# Patient Record
Sex: FEMALE | Race: WHITE | NOT HISPANIC OR LATINO | Employment: OTHER | URBAN - METROPOLITAN AREA
[De-identification: names, ages, dates, MRNs, and addresses within clinical notes are randomized per-mention and may not be internally consistent; named-entity substitution may affect disease eponyms.]

---

## 2017-01-12 ENCOUNTER — APPOINTMENT (OUTPATIENT)
Dept: INTERNAL MEDICINE | Facility: MEDICAL CENTER | Age: 77
End: 2017-01-12
Payer: MEDICARE

## 2017-03-23 ENCOUNTER — APPOINTMENT (OUTPATIENT)
Dept: RADIOLOGY | Facility: MEDICAL CENTER | Age: 77
DRG: 056 | End: 2017-03-23
Attending: EMERGENCY MEDICINE
Payer: MEDICARE

## 2017-03-23 ENCOUNTER — HOSPITAL ENCOUNTER (INPATIENT)
Facility: MEDICAL CENTER | Age: 77
LOS: 10 days | DRG: 056 | End: 2017-04-03
Attending: EMERGENCY MEDICINE | Admitting: INTERNAL MEDICINE
Payer: MEDICARE

## 2017-03-23 DIAGNOSIS — F02.81 ALZHEIMER'S DEMENTIA WITH BEHAVIORAL DISTURBANCE, UNSPECIFIED TIMING OF DEMENTIA ONSET: ICD-10-CM

## 2017-03-23 DIAGNOSIS — N39.0 ACUTE UTI: ICD-10-CM

## 2017-03-23 DIAGNOSIS — G30.9 ALZHEIMER'S DEMENTIA WITH BEHAVIORAL DISTURBANCE, UNSPECIFIED TIMING OF DEMENTIA ONSET: ICD-10-CM

## 2017-03-23 DIAGNOSIS — F01.518 VASCULAR DEMENTIA WITH BEHAVIOR DISTURBANCE (HCC): ICD-10-CM

## 2017-03-23 LAB
ALBUMIN SERPL BCP-MCNC: 4.7 G/DL (ref 3.2–4.9)
ALBUMIN/GLOB SERPL: 1.5 G/DL
ALP SERPL-CCNC: 91 U/L (ref 30–99)
ALT SERPL-CCNC: 16 U/L (ref 2–50)
ANION GAP SERPL CALC-SCNC: 13 MMOL/L (ref 0–11.9)
APPEARANCE UR: CLEAR
APPEARANCE UR: CLEAR
AST SERPL-CCNC: 27 U/L (ref 12–45)
BASOPHILS # BLD AUTO: 0.3 % (ref 0–1.8)
BASOPHILS # BLD: 0.05 K/UL (ref 0–0.12)
BILIRUB SERPL-MCNC: 0.5 MG/DL (ref 0.1–1.5)
BUN SERPL-MCNC: 15 MG/DL (ref 8–22)
CALCIUM SERPL-MCNC: 10.1 MG/DL (ref 8.5–10.5)
CHLORIDE SERPL-SCNC: 109 MMOL/L (ref 96–112)
CO2 SERPL-SCNC: 17 MMOL/L (ref 20–33)
COLOR UR AUTO: YELLOW
COLOR UR AUTO: YELLOW
COMMENT 1642: NORMAL
CREAT SERPL-MCNC: 1.01 MG/DL (ref 0.5–1.4)
EOSINOPHIL # BLD AUTO: 0.03 K/UL (ref 0–0.51)
EOSINOPHIL NFR BLD: 0.2 % (ref 0–6.9)
ERYTHROCYTE [DISTWIDTH] IN BLOOD BY AUTOMATED COUNT: 39.9 FL (ref 35.9–50)
GFR SERPL CREATININE-BSD FRML MDRD: 53 ML/MIN/1.73 M 2
GLOBULIN SER CALC-MCNC: 3.1 G/DL (ref 1.9–3.5)
GLUCOSE SERPL-MCNC: 111 MG/DL (ref 65–99)
GLUCOSE UR QL STRIP.AUTO: NEGATIVE MG/DL
GLUCOSE UR STRIP.AUTO-MCNC: NEGATIVE MG/DL
HCT VFR BLD AUTO: 44.4 % (ref 37–47)
HGB BLD-MCNC: 14.6 G/DL (ref 12–16)
IMM GRANULOCYTES # BLD AUTO: 0.11 K/UL (ref 0–0.11)
IMM GRANULOCYTES NFR BLD AUTO: 0.7 % (ref 0–0.9)
KETONES UR QL STRIP.AUTO: NEGATIVE MG/DL
KETONES UR STRIP.AUTO-MCNC: NEGATIVE MG/DL
LACTATE BLD-SCNC: 1.6 MMOL/L (ref 0.5–2)
LEUKOCYTE ESTERASE UR QL STRIP.AUTO: ABNORMAL
LEUKOCYTE ESTERASE UR QL STRIP.AUTO: NORMAL
LYMPHOCYTES # BLD AUTO: 1.22 K/UL (ref 1–4.8)
LYMPHOCYTES NFR BLD: 7.6 % (ref 22–41)
MCH RBC QN AUTO: 28.6 PG (ref 27–33)
MCHC RBC AUTO-ENTMCNC: 32.9 G/DL (ref 33.6–35)
MCV RBC AUTO: 86.9 FL (ref 81.4–97.8)
MONOCYTES # BLD AUTO: 0.7 K/UL (ref 0–0.85)
MONOCYTES NFR BLD AUTO: 4.3 % (ref 0–13.4)
MORPHOLOGY BLD-IMP: NORMAL
NEUTROPHILS # BLD AUTO: 14.02 K/UL (ref 2–7.15)
NEUTROPHILS NFR BLD: 86.9 % (ref 44–72)
NITRITE UR QL STRIP.AUTO: NEGATIVE
NITRITE UR QL STRIP.AUTO: NEGATIVE
NRBC # BLD AUTO: 0 K/UL
NRBC BLD AUTO-RTO: 0 /100 WBC
PH UR STRIP.AUTO: 5.5 [PH]
PH UR STRIP.AUTO: 5.5 [PH] (ref 5–8)
PLATELET # BLD AUTO: 146 K/UL (ref 164–446)
PMV BLD AUTO: 11 FL (ref 9–12.9)
POTASSIUM SERPL-SCNC: 3.8 MMOL/L (ref 3.6–5.5)
PROT SERPL-MCNC: 7.8 G/DL (ref 6–8.2)
PROT UR QL STRIP: ABNORMAL MG/DL
PROT UR QL STRIP: NORMAL MG/DL
RBC # BLD AUTO: 5.11 M/UL (ref 4.2–5.4)
RBC UR QL AUTO: ABNORMAL
RBC UR QL AUTO: NORMAL
SODIUM SERPL-SCNC: 139 MMOL/L (ref 135–145)
SP GR UR STRIP.AUTO: >=1.03
SP GR UR: >=1.03
TROPONIN I SERPL-MCNC: <0.01 NG/ML (ref 0–0.04)
WBC # BLD AUTO: 16.1 K/UL (ref 4.8–10.8)

## 2017-03-23 PROCEDURE — 80053 COMPREHEN METABOLIC PANEL: CPT

## 2017-03-23 PROCEDURE — 87040 BLOOD CULTURE FOR BACTERIA: CPT

## 2017-03-23 PROCEDURE — 85025 COMPLETE CBC W/AUTO DIFF WBC: CPT

## 2017-03-23 PROCEDURE — 96375 TX/PRO/DX INJ NEW DRUG ADDON: CPT

## 2017-03-23 PROCEDURE — 81002 URINALYSIS NONAUTO W/O SCOPE: CPT | Performed by: EMERGENCY MEDICINE

## 2017-03-23 PROCEDURE — 700105 HCHG RX REV CODE 258: Performed by: EMERGENCY MEDICINE

## 2017-03-23 PROCEDURE — 84484 ASSAY OF TROPONIN QUANT: CPT

## 2017-03-23 PROCEDURE — 96372 THER/PROPH/DIAG INJ SC/IM: CPT

## 2017-03-23 PROCEDURE — 83605 ASSAY OF LACTIC ACID: CPT

## 2017-03-23 PROCEDURE — 87086 URINE CULTURE/COLONY COUNT: CPT

## 2017-03-23 PROCEDURE — 81002 URINALYSIS NONAUTO W/O SCOPE: CPT

## 2017-03-23 PROCEDURE — 84100 ASSAY OF PHOSPHORUS: CPT

## 2017-03-23 PROCEDURE — 93005 ELECTROCARDIOGRAM TRACING: CPT | Performed by: EMERGENCY MEDICINE

## 2017-03-23 PROCEDURE — 94760 N-INVAS EAR/PLS OXIMETRY 1: CPT

## 2017-03-23 PROCEDURE — 96365 THER/PROPH/DIAG IV INF INIT: CPT

## 2017-03-23 PROCEDURE — 71010 DX-CHEST-PORTABLE (1 VIEW): CPT

## 2017-03-23 PROCEDURE — 99285 EMERGENCY DEPT VISIT HI MDM: CPT

## 2017-03-23 PROCEDURE — 83735 ASSAY OF MAGNESIUM: CPT

## 2017-03-23 PROCEDURE — 700111 HCHG RX REV CODE 636 W/ 250 OVERRIDE (IP): Performed by: EMERGENCY MEDICINE

## 2017-03-23 RX ORDER — CEFTRIAXONE 2 G/1
2 INJECTION, POWDER, FOR SOLUTION INTRAMUSCULAR; INTRAVENOUS ONCE
Status: COMPLETED | OUTPATIENT
Start: 2017-03-23 | End: 2017-03-23

## 2017-03-23 RX ORDER — ZIPRASIDONE MESYLATE 20 MG/ML
10 INJECTION, POWDER, LYOPHILIZED, FOR SOLUTION INTRAMUSCULAR ONCE
Status: COMPLETED | OUTPATIENT
Start: 2017-03-23 | End: 2017-03-23

## 2017-03-23 RX ORDER — MIDAZOLAM HYDROCHLORIDE 1 MG/ML
INJECTION INTRAMUSCULAR; INTRAVENOUS
Status: COMPLETED
Start: 2017-03-23 | End: 2017-03-23

## 2017-03-23 RX ORDER — SODIUM CHLORIDE 9 MG/ML
1000 INJECTION, SOLUTION INTRAVENOUS ONCE
Status: COMPLETED | OUTPATIENT
Start: 2017-03-23 | End: 2017-03-23

## 2017-03-23 RX ORDER — LORAZEPAM 2 MG/ML
1 INJECTION INTRAMUSCULAR ONCE
Status: COMPLETED | OUTPATIENT
Start: 2017-03-23 | End: 2017-03-23

## 2017-03-23 RX ADMIN — CEFTRIAXONE SODIUM 2 G: 2 INJECTION, POWDER, FOR SOLUTION INTRAMUSCULAR; INTRAVENOUS at 21:57

## 2017-03-23 RX ADMIN — SODIUM CHLORIDE 1000 ML: 9 INJECTION, SOLUTION INTRAVENOUS at 19:53

## 2017-03-23 RX ADMIN — ZIPRASIDONE MESYLATE 10 MG: 20 INJECTION, POWDER, LYOPHILIZED, FOR SOLUTION INTRAMUSCULAR at 20:24

## 2017-03-23 RX ADMIN — LORAZEPAM 1 MG: 2 INJECTION INTRAMUSCULAR; INTRAVENOUS at 19:50

## 2017-03-23 ASSESSMENT — PAIN SCALES - GENERAL: PAINLEVEL_OUTOF10: 0

## 2017-03-23 NOTE — IP AVS SNAPSHOT
" <p align=\"LEFT\"><IMG SRC=\"//[email or web address]$/[email or web address]\" alt=\"Image\" WIDTH=\"50%\" HEIGHT=\"200\" BORDER=\"\"></p>                   Name:Audrey Gauthier  Medical Record Number:[id number]  CSN: [id number]    YOB: 1940   Age: 77 y.o.  Sex: female  HT:1.626 m (5' 4.02\") WT: 52 kg (114 lb 10.2 oz)          Admit Date: 3/23/2017     Discharge Date:   Today's Date: 4/3/2017  Attending Doctor:  STEPHANIE Rodriguez*                  Allergies:  Review of patient's allergies indicates no known allergies.          Follow-up Information     1. Follow up with Tamia Saenz M.D.. Schedule an appointment as soon as possible for a visit in 3 weeks.    Specialty:  Internal Medicine    Why:  post DC follow up    Contact information    [address]  [address] [address] [phone]          2. Follow up with [facility] ([facility]) .    Specialty:  Skilled Nursing Facility    Contact information    [address] [place] [address] [phone]         Medication List      Take these Medications        Instructions    amlodipine 5 MG Tabs   Commonly known as:  NORVASC    Take 1 Tab by mouth every day.   Dose:  5 mg       aspirin 81 MG tablet    Take 81 mg by mouth every day.   Dose:  81 mg       coenzyme Q-10 30 MG capsule    Take 60 mg by mouth every day.   Dose:  60 mg       guaifenesin -10 MG/5ML Syrp syrup   Commonly known as:  ROBITUSSIN DM    Take 10 mL by mouth every 6 hours as needed for Cough.   Dose:  10 mL       quetiapine 25 MG Tabs   Commonly known as:  SEROQUEL    Take 2 Tabs by mouth every evening.   Dose:  50 mg       thiamine 100 MG tablet   Commonly known as:  THIAMINE    Take 1 Tab by mouth every day.   Dose:  100 mg       vitamin B-12 1000 MCG Tabs    Take 1,000 mcg by mouth every day.   Dose:  1000 mcg       vitamin D 1000 UNIT Tabs   Commonly known as:  cholecalciferol    Take 1,000 Units by mouth every day.   Dose:  1000 Units         "

## 2017-03-23 NOTE — IP AVS SNAPSHOT
4/3/2017          Audrey Gauthier  [address] [place] [address]    Dear Audrey:    [facility] wants to ensure your discharge home is safe and you or your loved ones have had all your questions answered regarding your care after you leave the hospital.    You may receive a telephone call within two days of your discharge.  This call is to make certain you understand your discharge instructions as well as ensure we provided you with the best care possible during your stay with us.     The call will only last approximately 3-5 minutes and will be done by a nurse.    Once again, we want to ensure your discharge home is safe and that you have a clear understanding of any next steps in your care.  If you have any questions or concerns, please do not hesitate to contact us, we are here for you.  Thank you for choosing [facility] for your healthcare needs.    Sincerely,    Jeremy Hogan    [facility]

## 2017-03-23 NOTE — IP AVS SNAPSHOT
" Home Care Instructions                                                                                                                  Name:Audrey Gauthier  Medical Record Number:[id number]  CSN: [id number]    YOB: 1940   Age: 77 y.o.  Sex: female  HT:1.626 m (5' 4.02\") WT: 52 kg (114 lb 10.2 oz)          Admit Date: 3/23/2017     Discharge Date:   Today's Date: 4/3/2017  Attending Doctor:  STEPHANIE Rodriguez*                  Allergies:  Review of patient's allergies indicates no known allergies.            Discharge Instructions       Discharge Instructions    Discharged to other by ambulance with self. Discharged via ambulance, hospital escort: Refused.  Special equipment needed: Not Applicable    Be sure to schedule a follow-up appointment with your primary care doctor or any specialists as instructed.     Discharge Plan:   Influenza Vaccine Indication: Not indicated: Previously immunized this influenza season and > 8 years of age    I understand that a diet low in cholesterol, fat, and sodium is recommended for good health. Unless I have been given specific instructions below for another diet, I accept this instruction as my diet prescription.   Other diet: full liquid nectar thick    Special Instructions: None    · Is patient discharged on Warfarin / Coumadin?   No     · Is patient Post Blood Transfusion?  No    Depression / Suicide Risk    As you are discharged from this [facility] facility, it is important to learn how to keep safe from harming yourself.    Recognize the warning signs:  · Abrupt changes in personality, positive or negative- including increase in energy   · Giving away possessions  · Change in eating patterns- significant weight changes-  positive or negative  · Change in sleeping patterns- unable to sleep or sleeping all the time   · Unwillingness or inability to communicate  · Depression  · Unusual sadness, discouragement and loneliness  · Talk of wanting to die  · Neglect of " personal appearance   · Rebelliousness- reckless behavior  · Withdrawal from people/activities they love  · Confusion- inability to concentrate     If you or a loved one observes any of these behaviors or has concerns about self-harm, here's what you can do:  · Talk about it- your feelings and reasons for harming yourself  · Remove any means that you might use to hurt yourself (examples: pills, rope, extension cords, firearm)  · Get professional help from the community (Mental Health, Substance Abuse, psychological counseling)  · Do not be alone:Call your Safe Contact- someone whom you trust who will be there for you.  · Call your local CRISIS HOTLINE 800-7793 or 829-511-5451  · Call your local Children's Mobile Crisis Response Team [place] (383) 503-3253 or www.[employer] ([employer])  · Call the toll free National Suicide Prevention Hotlines   · National Suicide Prevention Lifeline 273-223-XHWQ (7888)  · Wikia Line Network 800-SUICIDE (444-6174)        Follow-up Information     1. Follow up with Tamia Saenz M.D.. Schedule an appointment as soon as possible for a visit in 3 weeks.    Specialty:  Internal Medicine    Why:  post DC follow up    Contact information    [address]  [address]  [facility] [address] [phone]          2. Follow up with [facility] ([facility]) .    Specialty:  Skilled Nursing Facility    Contact information    [address] [place] [address] [phone]         Discharge Medication Instructions:    Below are the medications your physician expects you to take upon discharge:    Review all your home medications and newly ordered medications with your doctor and/or pharmacist. Follow medication instructions as directed by your doctor and/or pharmacist.    Please keep your medication list with you and share with your physician.               Medication List      START taking these medications        Instructions    amlodipine 5 MG Tabs   Last time this  was given:  5 mg on 4/3/2017  8:31 AM   Commonly known as:  NORVASC    Take 1 Tab by mouth every day.   Dose:  5 mg       guaifenesin -10 MG/5ML Syrp syrup   Commonly known as:  ROBITUSSIN DM    Take 10 mL by mouth every 6 hours as needed for Cough.   Dose:  10 mL       thiamine 100 MG tablet   Last time this was given:  100 mg on 4/2/2017  9:04 AM   Commonly known as:  THIAMINE    Take 1 Tab by mouth every day.   Dose:  100 mg         CONTINUE taking these medications        Instructions    aspirin 81 MG tablet    Take 81 mg by mouth every day.   Dose:  81 mg       coenzyme Q-10 30 MG capsule    Take 60 mg by mouth every day.   Dose:  60 mg       quetiapine 25 MG Tabs   Commonly known as:  SEROQUEL    Take 2 Tabs by mouth every evening.   Dose:  50 mg       vitamin B-12 1000 MCG Tabs    Take 1,000 mcg by mouth every day.   Dose:  1000 mcg       vitamin D 1000 UNIT Tabs   Commonly known as:  cholecalciferol    Take 1,000 Units by mouth every day.   Dose:  1000 Units         STOP taking these medications     ARICEPT 10 MG tablet   Generic drug:  donepezil       memantine 5 MG Tabs   Commonly known as:  NAMENDA       simvastatin 40 MG Tabs   Commonly known as:  ZOCOR       tolterodine ER 2 MG Cp24   Commonly known as:  DETROL-LA       tramadol 50 MG Tabs   Commonly known as:  ULTRAM               Instructions           Diet / Nutrition:    Follow any diet instructions given to you by your doctor or the dietician, including how much salt (sodium) you are allowed each day.    If you are overweight, talk to your doctor about a weight reduction plan.    Activity:    Remain physically active following your doctor's instructions about exercise and activity.    Rest often.     Any time you become even a little tired or short of breath, SIT DOWN and rest.    Worsening Symptoms:    Report any of the following signs and symptoms to the doctor's office immediately:    *Pain of jaw, arm, or neck  *Chest pain not relieved by  medication                               *Dizziness or loss of consciousness  *Difficulty breathing even when at rest   *More tired than usual                                       *Bleeding drainage or swelling of surgical site  *Swelling of feet, ankles, legs or stomach                 *Fever (>100ºF)  *Pink or blood tinged sputum  *Weight gain (3lbs/day or 5lbs /week)           *Shock from internal defibrillator (if applicable)  *Palpitations or irregular heartbeats                *Cool and/or numb extremities    Stroke Awareness    Common Risk Factors for Stroke include:    Age  Atrial Fibrillation  Carotid Artery Stenosis  Diabetes Mellitus  Excessive alcohol consumption  High blood pressure  Overweight   Physical inactivity  Smoking    Warning signs and symptoms of a stroke include:    *Sudden numbness or weakness of the face, arm or leg (especially on one side of the body).  *Sudden confusion, trouble speaking or understanding.  *Sudden trouble seeing in one or both eyes.  *Sudden trouble walking, dizziness, loss of balance or coordination.Sudden severe headache with no known cause.    It is very important to get treatment quickly when a stroke occurs. If you experience any of the above warning signs, call 911 immediately.                   Disclaimer         Quit Smoking / Tobacco Use:    I understand the use of any tobacco products increases my chance of suffering from future heart disease or stroke and could cause other illnesses which may shorten my life. Quitting the use of tobacco products is the single most important thing I can do to improve my health. For further information on smoking / tobacco cessation call a Toll Free Quit Line at 1-384.674.8336 (*National Cancer [place]) or 1-445.465.9548 (American Lung Association) or you can access the web based program at www.lungusa.org.    [place] Tobacco Users Help Line:  (296) 873-5106       Toll Free: 1-839.788.2614  Quit Tobacco Program [facility]  Management Services (260)598-2950    Crisis Hotline:    [place] Crisis Hotline:  0-665-GZKROSD or 1-539.368.9712    [place] Crisis Hotline:    1-940.973.5407 or 115-597-3656    Discharge Survey:   Thank you for choosing [facility]. We hope we did everything we could to make your hospital stay a pleasant one. You may be receiving a phone survey and we would appreciate your time and participation in answering the questions. Your input is very valuable to us in our efforts to improve our service to our patients and their families.        My signature on this form indicates that:    1. I have reviewed and understand the above information.  2. My questions regarding this information have been answered to my satisfaction.  3. I have formulated a plan with my discharge nurse to obtain my prescribed medications for home.                  Disclaimer         __________________________________                     __________       ________                       Patient Signature                                                 Date                    Time

## 2017-03-23 NOTE — IP AVS SNAPSHOT
Bevalley Access Code: Activation code not generated  Current Bevalley Status: Active    [employer]  A secure, online tool to manage your health information     [employer]’s Bevalley® is a secure, online tool that connects you to your personalized health information from the privacy of your home -- day or night - making it very easy for you to manage your healthcare. Once the activation process is completed, you can even access your medical information using the Bevalley lovely, which is available for free in the Apple Lovely store or Google Play store.     Bevalley provides the following levels of access (as shown below):   My Chart Features   [facility] Primary Care Doctor [facility]  Specialists [facility]  Urgent  Care Non-[facility]  Primary Care  Doctor   Email your healthcare team securely and privately 24/7 X X X X   Manage appointments: schedule your next appointment; view details of past/upcoming appointments X      Request prescription refills. X      View recent personal medical records, including lab and immunizations X X X X   View health record, including health history, allergies, medications X X X X   Read reports about your outpatient visits, procedures, consult and ER notes X X X X   See your discharge summary, which is a recap of your hospital and/or ER visit that includes your diagnosis, lab results, and care plan. X X       How to register for Bevalley:  1. Go to  [email or web address].  2. Click on the Sign Up Now box, which takes you to the New Member Sign Up page. You will need to provide the following information:  a. Enter your Bevalley Access Code exactly as it appears at the top of this page. (You will not need to use this code after you’ve completed the sign-up process. If you do not sign up before the expiration date, you must request a new code.)   b. Enter your date of birth.   c. Enter your home email address.   d. Click Submit, and follow the next screen’s instructions.  3. Create a Bevalley ID. This will  be your Benesight login ID and cannot be changed, so think of one that is secure and easy to remember.  4. Create a Benesight password. You can change your password at any time.  5. Enter your Password Reset Question and Answer. This can be used at a later time if you forget your password.   6. Enter your e-mail address. This allows you to receive e-mail notifications when new information is available in Benesight.  7. Click Sign Up. You can now view your health information.    For assistance activating your Benesight account, call [phone]

## 2017-03-24 ENCOUNTER — RESOLUTE PROFESSIONAL BILLING HOSPITAL PROF FEE (OUTPATIENT)
Dept: HOSPITALIST | Facility: MEDICAL CENTER | Age: 77
End: 2017-03-24
Payer: MEDICARE

## 2017-03-24 ENCOUNTER — APPOINTMENT (OUTPATIENT)
Dept: RADIOLOGY | Facility: MEDICAL CENTER | Age: 77
DRG: 056 | End: 2017-03-24
Attending: EMERGENCY MEDICINE
Payer: MEDICARE

## 2017-03-24 ENCOUNTER — APPOINTMENT (OUTPATIENT)
Dept: RADIOLOGY | Facility: MEDICAL CENTER | Age: 77
DRG: 056 | End: 2017-03-24
Attending: INTERNAL MEDICINE
Payer: MEDICARE

## 2017-03-24 PROBLEM — D72.829 LEUCOCYTOSIS: Status: ACTIVE | Noted: 2017-03-24

## 2017-03-24 PROBLEM — R41.82 ALTERED MENTAL STATUS: Status: ACTIVE | Noted: 2017-03-24

## 2017-03-24 PROBLEM — N39.0 URINARY TRACT INFECTION: Status: ACTIVE | Noted: 2017-03-24

## 2017-03-24 LAB
25(OH)D3 SERPL-MCNC: 26 NG/ML (ref 30–100)
B-OH-BUTYR SERPL-MCNC: 0.19 MMOL/L (ref 0.02–0.27)
BURR CELLS/RBC NFR CSF MANUAL: 0 %
CLARITY CSF: CLEAR
COLOR CSF: COLORLESS
COLOR SPUN CSF: COLORLESS
FOLATE SERPL-MCNC: 8.6 NG/ML
GLUCOSE CSF-MCNC: 75 MG/DL (ref 40–80)
GRAM STN SPEC: NORMAL
LV EJECT FRACT  99904: 65
LV EJECT FRACT MOD 2C 99903: 68.83
LV EJECT FRACT MOD 4C 99902: 56.08
LV EJECT FRACT MOD BP 99901: 61.54
LYMPHOCYTES NFR CSF: 56 %
MAGNESIUM SERPL-MCNC: 2.5 MG/DL (ref 1.5–2.5)
MONONUC CELLS NFR CSF: 40 %
NEUTROPHILS NFR CSF: 4 %
PHOSPHATE SERPL-MCNC: 2.6 MG/DL (ref 2.5–4.5)
PROT CSF-MCNC: 41 MG/DL (ref 15–45)
RBC # CSF: 6 CELLS/UL
SIGNIFICANT IND 70042: NORMAL
SITE SITE: NORMAL
SOURCE SOURCE: NORMAL
SPECIMEN VOL CSF: 4 ML
TSH SERPL DL<=0.005 MIU/L-ACNC: 1.69 UIU/ML (ref 0.3–3.7)
TUBE # CSF: 2
TUBE # CSF: 4
VIT B12 SERPL-MCNC: 314 PG/ML (ref 211–911)
WBC # CSF: 1 CELLS/UL (ref 0–10)

## 2017-03-24 PROCEDURE — 84443 ASSAY THYROID STIM HORMONE: CPT

## 2017-03-24 PROCEDURE — 36415 COLL VENOUS BLD VENIPUNCTURE: CPT

## 2017-03-24 PROCEDURE — 770020 HCHG ROOM/CARE - TELE (206)

## 2017-03-24 PROCEDURE — 99223 1ST HOSP IP/OBS HIGH 75: CPT | Mod: GC | Performed by: INTERNAL MEDICINE

## 2017-03-24 PROCEDURE — 700111 HCHG RX REV CODE 636 W/ 250 OVERRIDE (IP): Performed by: INTERNAL MEDICINE

## 2017-03-24 PROCEDURE — G8996 SWALLOW CURRENT STATUS: HCPCS | Mod: CL

## 2017-03-24 PROCEDURE — 82010 KETONE BODYS QUAN: CPT

## 2017-03-24 PROCEDURE — 82607 VITAMIN B-12: CPT

## 2017-03-24 PROCEDURE — 89051 BODY FLUID CELL COUNT: CPT

## 2017-03-24 PROCEDURE — 82746 ASSAY OF FOLIC ACID SERUM: CPT

## 2017-03-24 PROCEDURE — 82945 GLUCOSE OTHER FLUID: CPT

## 2017-03-24 PROCEDURE — 87205 SMEAR GRAM STAIN: CPT

## 2017-03-24 PROCEDURE — 92610 EVALUATE SWALLOWING FUNCTION: CPT

## 2017-03-24 PROCEDURE — 700105 HCHG RX REV CODE 258: Performed by: INTERNAL MEDICINE

## 2017-03-24 PROCEDURE — 87529 HSV DNA AMP PROBE: CPT

## 2017-03-24 PROCEDURE — G8997 SWALLOW GOAL STATUS: HCPCS | Mod: CI

## 2017-03-24 PROCEDURE — 70450 CT HEAD/BRAIN W/O DYE: CPT

## 2017-03-24 PROCEDURE — A9270 NON-COVERED ITEM OR SERVICE: HCPCS | Performed by: INTERNAL MEDICINE

## 2017-03-24 PROCEDURE — 009U3ZX DRAINAGE OF SPINAL CANAL, PERCUTANEOUS APPROACH, DIAGNOSTIC: ICD-10-PCS | Performed by: INTERNAL MEDICINE

## 2017-03-24 PROCEDURE — 700111 HCHG RX REV CODE 636 W/ 250 OVERRIDE (IP)

## 2017-03-24 PROCEDURE — 74176 CT ABD & PELVIS W/O CONTRAST: CPT

## 2017-03-24 PROCEDURE — 87070 CULTURE OTHR SPECIMN AEROBIC: CPT

## 2017-03-24 PROCEDURE — 700102 HCHG RX REV CODE 250 W/ 637 OVERRIDE(OP): Performed by: INTERNAL MEDICINE

## 2017-03-24 PROCEDURE — 82306 VITAMIN D 25 HYDROXY: CPT

## 2017-03-24 PROCEDURE — 700105 HCHG RX REV CODE 258: Performed by: STUDENT IN AN ORGANIZED HEALTH CARE EDUCATION/TRAINING PROGRAM

## 2017-03-24 PROCEDURE — 93306 TTE W/DOPPLER COMPLETE: CPT

## 2017-03-24 PROCEDURE — 84157 ASSAY OF PROTEIN OTHER: CPT

## 2017-03-24 PROCEDURE — 93306 TTE W/DOPPLER COMPLETE: CPT | Mod: 26 | Performed by: INTERNAL MEDICINE

## 2017-03-24 PROCEDURE — 700105 HCHG RX REV CODE 258

## 2017-03-24 RX ORDER — AMOXICILLIN 250 MG
2 CAPSULE ORAL 2 TIMES DAILY
Status: DISCONTINUED | OUTPATIENT
Start: 2017-03-24 | End: 2017-04-03 | Stop reason: HOSPADM

## 2017-03-24 RX ORDER — MEMANTINE HYDROCHLORIDE 10 MG/1
5 TABLET ORAL 2 TIMES DAILY
Status: DISCONTINUED | OUTPATIENT
Start: 2017-03-24 | End: 2017-03-24

## 2017-03-24 RX ORDER — QUETIAPINE FUMARATE 25 MG/1
50 TABLET, FILM COATED ORAL EVERY EVENING
Status: DISCONTINUED | OUTPATIENT
Start: 2017-03-24 | End: 2017-03-27

## 2017-03-24 RX ORDER — HALOPERIDOL 5 MG/ML
1 INJECTION INTRAMUSCULAR EVERY 6 HOURS PRN
Status: DISCONTINUED | OUTPATIENT
Start: 2017-03-24 | End: 2017-04-03 | Stop reason: HOSPADM

## 2017-03-24 RX ORDER — QUETIAPINE FUMARATE 25 MG/1
50 TABLET, FILM COATED ORAL EVERY EVENING
Status: DISCONTINUED | OUTPATIENT
Start: 2017-03-24 | End: 2017-03-24

## 2017-03-24 RX ORDER — DEXAMETHASONE SODIUM PHOSPHATE 4 MG/ML
8 INJECTION, SOLUTION INTRA-ARTICULAR; INTRALESIONAL; INTRAMUSCULAR; INTRAVENOUS; SOFT TISSUE EVERY 6 HOURS
Status: DISCONTINUED | OUTPATIENT
Start: 2017-03-24 | End: 2017-03-24

## 2017-03-24 RX ORDER — DONEPEZIL HYDROCHLORIDE 5 MG/1
10 TABLET, FILM COATED ORAL NIGHTLY
Status: DISCONTINUED | OUTPATIENT
Start: 2017-03-24 | End: 2017-03-24

## 2017-03-24 RX ORDER — HEPARIN SODIUM 5000 [USP'U]/ML
5000 INJECTION, SOLUTION INTRAVENOUS; SUBCUTANEOUS EVERY 8 HOURS
Status: DISCONTINUED | OUTPATIENT
Start: 2017-03-24 | End: 2017-04-03 | Stop reason: HOSPADM

## 2017-03-24 RX ORDER — SIMVASTATIN 20 MG
40 TABLET ORAL EVERY EVENING
Status: DISCONTINUED | OUTPATIENT
Start: 2017-03-24 | End: 2017-03-24

## 2017-03-24 RX ORDER — POLYETHYLENE GLYCOL 3350 17 G/17G
1 POWDER, FOR SOLUTION ORAL
Status: DISCONTINUED | OUTPATIENT
Start: 2017-03-24 | End: 2017-04-03 | Stop reason: HOSPADM

## 2017-03-24 RX ORDER — BISACODYL 10 MG
10 SUPPOSITORY, RECTAL RECTAL
Status: DISCONTINUED | OUTPATIENT
Start: 2017-03-24 | End: 2017-04-03 | Stop reason: HOSPADM

## 2017-03-24 RX ORDER — ATORVASTATIN CALCIUM 80 MG/1
80 TABLET, FILM COATED ORAL
Status: DISCONTINUED | OUTPATIENT
Start: 2017-03-24 | End: 2017-03-27

## 2017-03-24 RX ORDER — ONDANSETRON 2 MG/ML
4 INJECTION INTRAMUSCULAR; INTRAVENOUS EVERY 4 HOURS PRN
Status: DISCONTINUED | OUTPATIENT
Start: 2017-03-24 | End: 2017-04-03 | Stop reason: HOSPADM

## 2017-03-24 RX ORDER — ONDANSETRON 4 MG/1
4 TABLET, ORALLY DISINTEGRATING ORAL EVERY 4 HOURS PRN
Status: DISCONTINUED | OUTPATIENT
Start: 2017-03-24 | End: 2017-03-27

## 2017-03-24 RX ORDER — GUAIFENESIN/DEXTROMETHORPHAN 100-10MG/5
10 SYRUP ORAL EVERY 6 HOURS PRN
Status: DISCONTINUED | OUTPATIENT
Start: 2017-03-24 | End: 2017-04-03 | Stop reason: HOSPADM

## 2017-03-24 RX ORDER — ACETAMINOPHEN 325 MG/1
650 TABLET ORAL EVERY 6 HOURS PRN
Status: DISCONTINUED | OUTPATIENT
Start: 2017-03-24 | End: 2017-03-27

## 2017-03-24 RX ORDER — CHOLECALCIFEROL (VITAMIN D3) 125 MCG
1000 CAPSULE ORAL DAILY
Status: DISCONTINUED | OUTPATIENT
Start: 2017-03-24 | End: 2017-03-27

## 2017-03-24 RX ORDER — ASPIRIN 300 MG/1
300 SUPPOSITORY RECTAL EVERY 6 HOURS PRN
Status: DISCONTINUED | OUTPATIENT
Start: 2017-03-24 | End: 2017-03-24

## 2017-03-24 RX ORDER — HEPARIN SODIUM 5000 [USP'U]/ML
5000 INJECTION, SOLUTION INTRAVENOUS; SUBCUTANEOUS EVERY 8 HOURS
Status: DISCONTINUED | OUTPATIENT
Start: 2017-03-24 | End: 2017-03-24

## 2017-03-24 RX ORDER — ASPIRIN 300 MG/1
300 SUPPOSITORY RECTAL ONCE
Status: COMPLETED | OUTPATIENT
Start: 2017-03-24 | End: 2017-03-24

## 2017-03-24 RX ORDER — LORAZEPAM 2 MG/ML
1 INJECTION INTRAMUSCULAR ONCE
Status: COMPLETED | OUTPATIENT
Start: 2017-03-24 | End: 2017-03-25

## 2017-03-24 RX ORDER — SODIUM CHLORIDE 9 MG/ML
INJECTION, SOLUTION INTRAVENOUS CONTINUOUS
Status: DISCONTINUED | OUTPATIENT
Start: 2017-03-24 | End: 2017-03-27

## 2017-03-24 RX ADMIN — HEPARIN SODIUM 5000 UNITS: 5000 INJECTION INTRAVENOUS; SUBCUTANEOUS at 13:48

## 2017-03-24 RX ADMIN — ASPIRIN 300 MG: 300 SUPPOSITORY RECTAL at 12:07

## 2017-03-24 RX ADMIN — ACYCLOVIR SODIUM 547 MG: 500 INJECTION, SOLUTION INTRAVENOUS at 06:38

## 2017-03-24 RX ADMIN — AMPICILLIN SODIUM 2000 MG: 500 INJECTION, POWDER, FOR SOLUTION INTRAMUSCULAR; INTRAVENOUS at 12:52

## 2017-03-24 RX ADMIN — VANCOMYCIN HYDROCHLORIDE 1500 MG: 100 INJECTION, POWDER, LYOPHILIZED, FOR SOLUTION INTRAVENOUS at 07:26

## 2017-03-24 RX ADMIN — HEPARIN SODIUM 5000 UNITS: 5000 INJECTION INTRAVENOUS; SUBCUTANEOUS at 06:37

## 2017-03-24 RX ADMIN — ACYCLOVIR SODIUM 547 MG: 500 INJECTION, SOLUTION INTRAVENOUS at 13:48

## 2017-03-24 RX ADMIN — DEXAMETHASONE SODIUM PHOSPHATE 8 MG: 4 INJECTION, SOLUTION INTRAMUSCULAR; INTRAVENOUS at 06:47

## 2017-03-24 RX ADMIN — AMPICILLIN SODIUM AND SULBACTAM SODIUM 3 G: 2; 1 INJECTION, POWDER, FOR SOLUTION INTRAMUSCULAR; INTRAVENOUS at 23:54

## 2017-03-24 RX ADMIN — ACYCLOVIR SODIUM 547 MG: 500 INJECTION, SOLUTION INTRAVENOUS at 20:42

## 2017-03-24 RX ADMIN — HEPARIN SODIUM 5000 UNITS: 5000 INJECTION INTRAVENOUS; SUBCUTANEOUS at 20:42

## 2017-03-24 RX ADMIN — AMPICILLIN SODIUM AND SULBACTAM SODIUM 3 G: 2; 1 INJECTION, POWDER, FOR SOLUTION INTRAMUSCULAR; INTRAVENOUS at 19:50

## 2017-03-24 RX ADMIN — SODIUM CHLORIDE: 9 INJECTION, SOLUTION INTRAVENOUS at 06:44

## 2017-03-24 RX ADMIN — HALOPERIDOL LACTATE 1 MG: 5 INJECTION, SOLUTION INTRAMUSCULAR at 18:33

## 2017-03-24 ASSESSMENT — COPD QUESTIONNAIRES
HAVE YOU SMOKED AT LEAST 100 CIGARETTES IN YOUR ENTIRE LIFE: YES
HAVE YOU SMOKED AT LEAST 100 CIGARETTES IN YOUR ENTIRE LIFE: YES

## 2017-03-24 ASSESSMENT — PAIN SCALES - GENERAL
PAINLEVEL_OUTOF10: 0

## 2017-03-24 ASSESSMENT — LIFESTYLE VARIABLES
ALCOHOL_USE: NO
EVER_SMOKED: NEVER
EVER_SMOKED: YES

## 2017-03-24 NOTE — PROGRESS NOTES
Pt arrived to the N Unit via ER. R/L soft wrist restraints. BMx1. Confused X4. IVF infusing. Call light at bedside, room close to nurses station.

## 2017-03-24 NOTE — ASSESSMENT & PLAN NOTE
- has h/o dementia (alziemer's and/or vascular). H/o agitation in the past.   - Seems to be at her baseline now (see above)  - Holding home donepezil and memantine, unsure of the benefit given her severe dementia.

## 2017-03-24 NOTE — PROGRESS NOTES
"Pharmacy Kinetics 77 y.o. female on vancomycin day # 1 3/24/2017    Currently on Vancomycin 1500 mg iv x 1 loading dose to be given ~ 05:00    Indication for Treatment: r/o bacterial meningitis    Pertinent history per medical record: Admitted on 3/23/2017 for worsening mental status.  PMH: dementia with behavioral disturbance, unable to care for herself, requiring 24 hour supervision.  However SO noted yesterday patient was drowsy, incoherent and did not eat.  Empiric antibiotics initiated for r/o viral or bacterial meningitis.  Patient is negative for fever, nuchal rigidity, photophobia and seizure but positive for change in mental status.  SO is refusing LP at this time.      Other antibiotics: Ampicillin 2 g IV q4h, Acyclovir 547 mg IV q8h, Rocephin 2 g IV q12h    Allergies: Review of patient's allergies indicates no known allergies.     List concerns for renal function:  Age, elevate SCr (1.01), nephrotoxic medications (Acyclovir)    Pertinent cultures to date:   03/24/17 HSV by PCR not yet donse  03/24/17 LP not yet done  03/23/17 BC x 2 NGTD  03/23/17 urine culture NGTD      Recent Labs      03/23/17 1955   WBC  16.1*   NEUTSPOLYS  86.90*     Recent Labs      03/23/17 1955   BUN  15   CREATININE  1.01   ALBUMIN  4.7     No results for input(s): VANCOTROUGH, VANCOPEAK, VANCORANDOM in the last 72 hours.No intake or output data in the 24 hours ending 03/24/17 0257   Blood pressure 133/68, pulse 78, temperature 36.4 °C (97.5 °F), temperature source Temporal, resp. rate 18, height 1.626 m (5' 4\"), weight 61.236 kg (135 lb), SpO2 91 %.     A/P   1. Vancomycin dose change: new start, loading dose not yet given, probable pulse dosing indicated  2. Next vancomycin level: clinical pharmacist to determine  3. Goal trough: 18 - 22 mcg/mL  4. Comments: Patient is at high risk of accumulation.  Daytime clinical pharmacist to follow up, determine when next level is to be collected, and make changes as clinical status " dictates.  Pharmacy will continue to monitor and adjust or de-escalate as appropriate.        Edilia Santos [facility]

## 2017-03-24 NOTE — PROGRESS NOTES
Pt is a/o x 1 to self presently, but in the beginning of the shift completely disoriented. Pt has significant other at bedside and states she is currently at baseline in regards to her dementia. Pt is currently restrained with bilateral wrist restraints in regards to pulling lines, IV fluids running, tele monitor applied, pt is NPO because of inability to follow commands. Pt/cg informed to continue plan of care as ordered, all questions answered, call light within reach.

## 2017-03-24 NOTE — ED NOTES
New PIV established, blood obtained and sent to lab. Pt combative and not cooperative, pt medicated per MAR.  at bedside.

## 2017-03-24 NOTE — ED NOTES
Mini cath preformed again, urine sample obtained and POC UA preformed. Results uploaded and ERP notified of results. Pt resting comfortably on gurney. Pt with no changes from previous assessments. Respirations are even and unlabored. Bed in lowest position, call light within reach. Pt with no further needs at this time.

## 2017-03-24 NOTE — PROGRESS NOTES
[facility] Interval Note    Name Audrey Gauthier     1940   Age/Sex 77 y.o. female   MRN [id number]   Code Status FULL     After 5PM or if no immediate response to page, please call for cross-coverage  Attending/Team: Dr Rojas/ kecia  Call [phone] to page   1st Call - Day Intern (R1):   SOTERO Patel 2nd Call - Day Sr. Resident (R2/R3):   Dr Ruiz         Chief complaint/ reason for interval visit (Primary Diagnosis)   Altered mental status    Interval Problem Daily Status Update    Subjective: AAO x 0, cannot assess.       - Performed LP. CSF: Glucose 75, TP 41, no organisms. HSV pending  - Stopped ampicillin, vanco. Decreased C3 to 1mg (just so if we are missing UTI (last results were by dipstick) ). Got 1 dose of decadron this morning.   - Normal B12, TSH, UDS positive for benzos. Borderline Vitamin D.   - Ordered ECHO, Carotid USG, PT/OT/SLP.   - Ordered ASA rectal suppository, simvastatin changed to lipitor 80mg.       Review of Systems   Unable to perform ROS: medical condition       Consultants/Specialty  None    Disposition  Inpatient for AMS evaluation and management    Quality Measures  Labs reviewed, Medications reviewed, EKG reviewed and Radiology images reviewed  Salcedo catheter: No Salcedo      DVT Prophylaxis: Heparin    Ulcer prophylaxis: Not indicated  : yes.            Physical Exam       Filed Vitals:    17 0500 17 0501 17 0544 17 0800   BP:   139/92 169/70   Pulse:  72 67 65   Temp:   36.8 °C (98.2 °F) 36.9 °C (98.5 °F)   TempSrc:       Resp:   18 18   Height:       Weight: 52 kg (114 lb 10.2 oz)      SpO2:  100% 94% 92%     Body mass index is 19.67 kg/(m^2). Weight: 52 kg (114 lb 10.2 oz)  Oxygen Therapy:  Pulse Oximetry: 92 %, O2 (LPM): 0, O2 Delivery: None (Room Air)    Physical Exam   Constitutional:   Patient is altered.   HENT:   Head: Normocephalic and atraumatic.   Eyes: EOM are normal.   Pupils are less reactive and  mildly dilated.    Neck: Normal range of motion. Neck supple.   She resists movement of the neck   Cardiovascular: Normal rate and regular rhythm.  Exam reveals no gallop and no friction rub.    No murmur heard.  Pulmonary/Chest: Effort normal and breath sounds normal. She has no wheezes. She has no rales.   Abdominal: Soft. Bowel sounds are normal. She exhibits no distension. There is no tenderness. There is no rebound.   Musculoskeletal: Normal range of motion.   Neurological:   AAO x O  Speech: Fluent but word salad  Strength: 5/5 all 4 extremities  Reflexes: 2+ in knee, ankle, brachialis and brachioradialis.  Babinski: Withdrawing both feet.    Skin: Skin is warm.         Lab Data Review:      3/24/2017  1:17 PM    Recent Labs      03/23/17 1955   SODIUM  139   POTASSIUM  3.8   CHLORIDE  109   CO2  17*   BUN  15   CREATININE  1.01   MAGNESIUM  2.5   PHOSPHORUS  2.6   CALCIUM  10.1       Recent Labs      03/23/17 1955   ALTSGPT  16   ASTSGOT  27   ALKPHOSPHAT  91   TBILIRUBIN  0.5   GLUCOSE  111*       Recent Labs      03/23/17 1955   RBC  5.11   HEMOGLOBIN  14.6   HEMATOCRIT  44.4   PLATELETCT  146*       Recent Labs      03/23/17 1955   WBC  16.1*   NEUTSPOLYS  86.90*   LYMPHOCYTES  7.60*   MONOCYTES  4.30   EOSINOPHILS  0.20   BASOPHILS  0.30   ASTSGOT  27   ALTSGPT  16   ALKPHOSPHAT  91   TBILIRUBIN  0.5           Assessment/Plan     Altered mental status  Assessment & Plan  - Pt was admitted for acute onset of AMS. He baseline is: demented but was not confused, she walked around  - On exam: word salad and normal motor exam  - WBC16 with left shift. UA wnl. Normal B12 and TSH. borderline Vit D. UDS positive for benzos.   - CT scan:  Bilateral ventricular dilatation, appears increased since prior study.  - CXR, CT abdomen wnl.   - LP (3/24): CSF: OP: 12, Glucose 75, TP 41, no organisms. HSV pending   - MRI pending  - Got vanco, C3, ampicillin and decadron on admission.   DDx: Stroke vs encephalitis.  Home medications and UTI might be the cause. Bacterial meningitis and metabolic causes are almost ruled out.   Plan  - Fall, seizures and aspiration precautions, Q4 neurochecks.   - Aspirin and atorvastatin  - PT/OT/SLP  - Will consult neurology if necessary.     Vascular dementia with behavior disturbance  Assessment & Plan  - has h/o dementia ( alziemer's and/or vascular).  - H/o agitation in the past  - Held memantine and donepezil for now.     Leucocytosis  Assessment & Plan  - leucocytosis with left shift  - UA, CXR, abd CT wnl.   - ?Aspiration  Plan  - CTM  - Will continue ceftriaxone for now     Hyperlipidemia  Assessment & Plan  - Switched simvastatin to lipitor 80mg    B12 deficiency  Assessment & Plan  - Normal B12    Vitamin D deficiency  Assessment & Plan  - Vit D 26  - will start vit d supplements when she is cleared by speech    CKD (chronic kidney disease) stage 3, GFR 30-59 ml/min  Assessment & Plan  - Stable  - CTM    Overactive bladder  Assessment & Plan  - held tolterodine for now

## 2017-03-24 NOTE — ED NOTES
Pt medicated per MAR. Will attempt another mini cath per ERPs request post infusion. Pt resting comfortably on gurney. Pt with no changes from previous assessments. Respirations are even and unlabored. Bed in lowest position, call light within reach. Pt with no further needs at this time.

## 2017-03-24 NOTE — ED NOTES
CXR at bedside. Pt resting comfortably on gurney. Pt with no changes from previous assessments. Respirations are even and unlabored. Bed in lowest position, call light within reach. Pt with no further needs at this time.

## 2017-03-24 NOTE — PROGRESS NOTES
Changed ceftriaxone to unasyn (for possible aspiration PNA). Will order PT/OT when she can participate.   Spoke to her significant other this afternoon. He states that she is at her baseline and he wants to take her home. Gaurdianship is pending with . Me and my senior (Dr Salter) explained to him that she needs MRI, ECHO and carotid USG to rule out stroke. He agreed reluctantly.

## 2017-03-24 NOTE — ED NOTES
Transport here to take pt to floor. Pt transferred in stable condition with all belongings. Floor notified.

## 2017-03-24 NOTE — ASSESSMENT & PLAN NOTE
- Resolved  - She had mild leucocytosis with left shift on admission. UA was abnormal but not diagnostic of UTI. BC, UC and CSF cultures were negative. Treated for possible aspiration pneumonia with 5 days unasyn.

## 2017-03-24 NOTE — ED NOTES
Pt back in room from imaging.  at bedside. Pt resting comfortably on gurney. Pt with no changes from previous assessments. Respirations are even and unlabored. Bed in lowest position, call light within reach. Pt with no further needs at this time.

## 2017-03-24 NOTE — ED NOTES
Pt informed of room assignment. Pt resting comfortably on gurney. Pt with no changes from previous assessments. Respirations are even and unlabored. Bed in lowest position, call light within reach. Pt with no further needs at this time.

## 2017-03-24 NOTE — ED NOTES
at bedside to collect blood for repeat labs. Pt resting comfortably on gurney. Bilat wrist restraints verified and CMS/ROM intact.  Pt with no changes from previous assessments. Respirations are even and unlabored. Bed in lowest position, call light within reach. Pt with no further needs at this time.

## 2017-03-24 NOTE — ED NOTES
Pt medicated per ERPs orders. Pt applied in bilat soft wrist restraints per ERPs orders.  and pt educated,  agrees with restraint use. Mini cath preformed and urine sample sent to lab.

## 2017-03-24 NOTE — ASSESSMENT & PLAN NOTE
- Initially simvastatin was changed to lipitor   - Pt is not cooperative enough with PO medications. Benefits of statin therapy in her situation will be limited due to advanced dementia.  - Will hold off lipid therapy

## 2017-03-24 NOTE — DISCHARGE PLANNING
[facility] attempted to meet with patient, but per nursing, she is not A&O enough for a screening. No family bedside. Will attempt to revisit later.

## 2017-03-24 NOTE — ED PROVIDER NOTES
ED Provider Note    HPI: Patient is a 77-year-old female who presented to the emergency department March 23, 2017 at 4:50 PM with a chief complaint of altered mental status.    Patient has severe dementia and cannot give any other patient was actually better than usual yesterday but today has been extremely confused and altered. No trauma. No fever no chills no cough. No further history could be obtained from patient due to severe dementia    Review of Systems: Cannot be obtained due to dementia    Past medical/surgical history: Severe dementia and frequent falls depression hypertension and reflux disease high cholesterol    Medications: Zocor Namenda Seroquel detrol Ultram aricept    Allergies: None    Social History:  Remote history of smoking and very occasional use of alcohol      Physical exam: Constitutional:  Slender female agitated  Vital signs:  Temperature 97.5 pulse 63 respirations 18 blood pressure 133/68 pulse oximetry 94%  EYES: PERRL, EOMI, Conjunctivae and sclera normal, eyelids normal bilaterally.  Neck: Trachea midline. No cervical masses seen or palpated. Normal range of motion, supple. No meningeal signs elicited.  Cardiac: Regular rate and rhythm. S1-S2 present. No S3 or S4 present. No murmurs, rubs, or gallops heard. No edema or varicosities were seen.   Lungs: Clear to auscultation with good aeration throughout. No wheezes, rales, or rhonchi heard. Patient's chest wall moved symmetrically with each respiratory effort. Patient was not making use of accessory muscles of respiration in breathing.  Abdomen: Soft nontender to palpation. No rebound or guarding elicited. No organomegaly identified. No pulsatile abdominal masses identified.   Musculoskeletal:  no  pain with palpitation or movement of muscle, bone or joint , no obvious musculoskeletal deformities identified.  Neurologic: Awake and stated no further neurologic exam possible due to dementia  Skin: no rash or lesion seen, no palpable  dermatologic lesions identified.  Psychiatric: Agitated. No further psychiatric exam possible due to dementia    Medical decision making:  Laboratory studies obtained (please see lab sheet problems also). Findings included point of care urinalysis positive for blood and leukocyte esterase. White count 16 point 1 and lactic acid normal.    Patient given Rocephin IV.    CT scan of the abdomen obtained no acute abnormality seen. Chest x-ray obtained, no acute abnormality seen.    Patient better by medical service. Suspect disruptions urinary tract infection. A chest x-ray is unremarkable and she has no evidence of intra-abdominal infection at this time specifically no evidence of diverticulitis or other abnormality. Given the absence of high fever pyelonephritis seems less likely    Impression urinary tract infection  2) dementia with behavioral disturbance

## 2017-03-24 NOTE — SENIOR ADMIT NOTE
"History was obtained from Partner  77 year old female with PMH of severe Alzheimer dementia, GERD, urinary incontinence and lumbar pain. Was brought by her significant other for acute AMS. Patient was at her baseline and field yesterday, she woke up to the parents she was afebrile, lethargic, weak and disoriented. Her partner called our behavioral and Dr. Warren was advised him to bring the patient to ED. He denies fever, cough, diarrhea. He reports worsening dementia and the patient is currently totally dependent on him in terms of ADLs and IADLs. At baseline she is able to speak clearly, walk without support. In the ED, She was given ceftriaxone, 1 L of saline, Ativan, ziprasidone and fentanyl. her labs showed WBC 16.1, hemoglobin 14.6, platelet 146, troponin 0.01, HCO3 17, glucose 111, BUN/creatinine 15, creatinine 1.01, calcium 10.1 albumin 4.7, lactic acid 1.5, UA showed trace LE, -ve N.  Chest x-ray showed left basilar atelectasis but no focal infiltrate  CT renal showed lung nodule, diverticulosis and a small bilateral inguinal hair in the pubic    /68 mmHg  Pulse 73  Temp(Src) 36.4 °C (97.5 °F) (Temporal)  Resp 18  Ht 1.626 m (5' 4\")  Wt 61.236 kg (135 lb)  BMI 23.16 kg/m2  SpO2 92%     Patient was lethargic, disoriented, mumbling a few words, GCS 10 (eyes open, incomprehensible sounds, with throat pain)  on physical restraints   No raised JVP, no lymphadenopathy  Neck slightly rigid but negative Kernig's and Brudzinski signs  Chest is clear to auscultation, normal S1-S2, RRR, no G MR  Abdomen is soft, no guarding rigidity or tenderness or organomegaly      Impression  77-year-old female with severe dementia, who presents with delirium and sudden change in her baseline mentation, WBCs is high, urine is not suggestive of UTI, will get CT head will treat her empirically with ceftriaxone, ampicillin, and vancomycin for possible meningitis.   Her partner does not want to go with aggressive " measures such as lumbar puncture, though he does not want comfort measures either. He wants full code and antibiotics for now.    AMS 2/2 sepsis of unknown source  Leukocytosis  Metabolic acidosis  Severe dementia  GERD  Urinary incontinence    Plan  Admit to inpatient  IVF   Stat CT head  Blood culture and urine culture  Check OH- hydroxybutyrate  Empiric antibiotics with ceftriaxone, vancomycin and ampicillin +/- acyclovir  Check TSH, vitamin D, B-12, folate, phosphorus and magnesium  Coretrack to resume her home medications and for feeding    Full code  Heparin for DVT prophylaxis

## 2017-03-24 NOTE — H&P
[facility] Internal Medicine Admitting History and Physical    Name Audrey Gauthier     1940   Age/Sex 77 y.o. female   MRN [id number]   Code Status Full code     After 5PM or if no immediate response to page, please call for cross-coverage  Attending/Team: DAGOBERTO Talavera/ Dr. Rojas Call [phone] to page   1st Call - Day Intern (R1):   Dr Julio Cesar BEY 2nd Call - Day Sr. Resident (R2/R3):   Dr. Murray      History obtained by Significant other    Chief Complaint:  Altered mental status     HPI:  Ms. Gauthier is a 76 yo female with PMH of dementia with behavioral disturbance,(alzheimers + dementia)  HTN, DLD, GERD, vitamin D deficiency brought in by her significant other for worsening mental status. At baseline she is unable to take care of herself, needs 24/7 supervision for ADL and IADL. She is usually alert and agitated sometimes but today her SO noted that she did not eat her breakfast. She appeared drowsy, her speech is incoherent.   He did not notice fever, cough, shortness of breath, no loss of conciousness, no diarrhea. No abnormal jerky movements.     In the ED /68 mmHg  Pulse 78  Temp(Src) 36.4 °C (97.5 °F) (Temporal)  Resp 18   SpO2 91% POC UA showed blood and small LE. CT head - empiric antibiotics were started to cover for meningitis, discussed with SO about the need for LP but he declined it at this time but he is agreeable to treating with antibiotics.     Review of Systems   Unable to perform ROS: dementia             Past Medical History:   Past Medical History   Diagnosis Date   • Dementia with behavioral disturbance      AD and vasc   • Impairment of balance      R/o falls; shuffling steps; uses walker   • Depression    • De Quervain's disease (tenosynovitis)    • OAB (overactive bladder)      on med Rx'd by uro   • Hypertension      off meds   • Vitamin D deficiency    • Loss of hearing      doesn't use aids   • GERD (gastroesophageal reflux disease)      not on med; controlled with diet  "  • Dyslipidemia      on statin    • Lumbar pain      sees ortho; on meds   • Overactive bladder 7/7/2016   • Imbalance 7/7/2016   • Alzheimer's disease 7/7/2016   • UI (urinary incontinence) 7/7/2016   • H/O bilateral breast reduction surgery        Past Surgical History:  Past Surgical History   Procedure Laterality Date   • Pr reduction of large breast         Current Outpatient Medications:  Home Medications           Simvastatin 40 mg once daily  Namenda 5 mg 2 times a day  Coenzyme Q 60 mg 2 times a day   Seroquel 50 mg daily at bedtime  Detrol 2 mg every day  Tramadol 50 mg every 8 hourly  Aspirin 81 mg once daily  Aricept 10 mg once daily  Vitamin B-12 thousand mcg once daily  Vitamin D thousand units every day    Medication Allergy/Sensitivities:  No Known Allergies      Family History:  Unable to obtain;mental status    Social History:  Social History     Social History   • Marital Status: Single     Spouse Name: N/A   • Number of Children: N/A   • Years of Education: N/A     Occupational History   • Not on file.     Social History Main Topics   • Smoking status: Former Smoker -- 0 years   • Smokeless tobacco: Not on file   • Alcohol Use: 0.0 oz/week     0 Standard drinks or equivalent per week      Comment: occ   • Drug Use: No   • Sexual Activity: Not on file     Other Topics Concern   • Not on file     Social History Narrative    Lives at home with sign other.      ADL and IADL impairment.    See H&P for additional details.     Living situation: lives at home with significant other   PCP : Tamia Saenz M.D.        Physical Exam     Filed Vitals:    03/24/17 0030 03/24/17 0101 03/24/17 0130 03/24/17 0216   BP:       Pulse: 69 82 73 78   Temp:       TempSrc:       Resp:       Height:       Weight:       SpO2: 93% 92% 92% 91%     Body mass index is 23.16 kg/(m^2).  /68 mmHg  Pulse 78  Temp(Src) 36.4 °C (97.5 °F) (Temporal)  Resp 18  Ht 1.626 m (5' 4\")  Wt 61.236 kg (135 lb)  BMI 23.16 kg/m2  " SpO2 91%  O2 therapy: Pulse Oximetry: 91 %, O2 (LPM): 0    Physical Exam   Constitutional: She is well-developed, well-nourished, and in no distress.   HENT:   Head: Normocephalic and atraumatic.   Eyes: Pupils are equal, round, and reactive to light.   Neck: Normal range of motion.   Cardiovascular: Normal rate, regular rhythm and normal heart sounds.    No murmur heard.  Pulmonary/Chest: Effort normal and breath sounds normal. She has no wheezes. She has no rales.   Abdominal: Soft. Bowel sounds are normal. She exhibits no distension. There is no tenderness.   Musculoskeletal: She exhibits no edema.   Neurological:   Appear drowsy but can be aroused  Incoherent speech  Moves limbs spontaneously  Neck rigidity present, kernig's sign and brudzinki's sign negative   Skin: Skin is warm.   Psychiatric:   Agitated, aggressive sometimes, on  restraints   Physical exam is limited as patient is not co-operative          Data Review       Old Records Request:   Completed  Current Records review and summary: Completed    Lab Data Review:  Recent Results (from the past 24 hour(s))   EKG (NOW)    Collection Time: 17  7:23 PM   Result Value Ref Range    Report       [facility] Emergency Dept.    Test Date:  2017  Pt Name:    MALISSA SALAS                   Department: ER  MRN:        [id number]                      Room:       [facility]  Gender:     F                            Technician: [id number]  :        1940                   Requested By:GERMÁN YAP  Order #:    [id number]                    Reading MD:    Measurements  Intervals                                Axis  Rate:       58                           P:          0  MT:         156                          QRS:        8  QRSD:       68                           T:          34  QT:         440  QTc:        433    Interpretive Statements  SINUS BRADYCARDIA  LOW VOLTAGE IN FRONTAL LEADS  Compared to ECG 2015 22:30:56  Sinus rhythm  no longer present     CBC WITH DIFFERENTIAL    Collection Time: 03/23/17  7:55 PM   Result Value Ref Range    WBC 16.1 (H) 4.8 - 10.8 K/uL    RBC 5.11 4.20 - 5.40 M/uL    Hemoglobin 14.6 12.0 - 16.0 g/dL    Hematocrit 44.4 37.0 - 47.0 %    MCV 86.9 81.4 - 97.8 fL    MCH 28.6 27.0 - 33.0 pg    MCHC 32.9 (L) 33.6 - 35.0 g/dL    RDW 39.9 35.9 - 50.0 fL    Platelet Count 146 (L) 164 - 446 K/uL    MPV 11.0 9.0 - 12.9 fL    Neutrophils-Polys 86.90 (H) 44.00 - 72.00 %    Lymphocytes 7.60 (L) 22.00 - 41.00 %    Monocytes 4.30 0.00 - 13.40 %    Eosinophils 0.20 0.00 - 6.90 %    Basophils 0.30 0.00 - 1.80 %    Immature Granulocytes 0.70 0.00 - 0.90 %    Nucleated RBC 0.00 /100 WBC    Neutrophils (Absolute) 14.02 (H) 2.00 - 7.15 K/uL    Lymphs (Absolute) 1.22 1.00 - 4.80 K/uL    Monos (Absolute) 0.70 0.00 - 0.85 K/uL    Eos (Absolute) 0.03 0.00 - 0.51 K/uL    Baso (Absolute) 0.05 0.00 - 0.12 K/uL    Immature Granulocytes (abs) 0.11 0.00 - 0.11 K/uL    NRBC (Absolute) 0.00 K/uL   COMP METABOLIC PANEL    Collection Time: 03/23/17  7:55 PM   Result Value Ref Range    Sodium 139 135 - 145 mmol/L    Potassium 3.8 3.6 - 5.5 mmol/L    Chloride 109 96 - 112 mmol/L    Co2 17 (L) 20 - 33 mmol/L    Anion Gap 13.0 (H) 0.0 - 11.9    Glucose 111 (H) 65 - 99 mg/dL    Bun 15 8 - 22 mg/dL    Creatinine 1.01 0.50 - 1.40 mg/dL    Calcium 10.1 8.5 - 10.5 mg/dL    AST(SGOT) 27 12 - 45 U/L    ALT(SGPT) 16 2 - 50 U/L    Alkaline Phosphatase 91 30 - 99 U/L    Total Bilirubin 0.5 0.1 - 1.5 mg/dL    Albumin 4.7 3.2 - 4.9 g/dL    Total Protein 7.8 6.0 - 8.2 g/dL    Globulin 3.1 1.9 - 3.5 g/dL    A-G Ratio 1.5 g/dL   TROPONIN    Collection Time: 03/23/17  7:55 PM   Result Value Ref Range    Troponin I <0.01 0.00 - 0.04 ng/mL   ESTIMATED GFR    Collection Time: 03/23/17  7:55 PM   Result Value Ref Range    GFR If African American >60 >60 mL/min/1.73 m 2    GFR If Non  53 (A) >60 mL/min/1.73 m 2   PERIPHERAL SMEAR REVIEW    Collection Time:  03/23/17  7:55 PM   Result Value Ref Range    Peripheral Smear Review see below    DIFFERENTIAL COMMENT    Collection Time: 03/23/17  7:55 PM   Result Value Ref Range    Comments-Diff see below    LACTIC ACID    Collection Time: 03/23/17  9:14 PM   Result Value Ref Range    Lactic Acid 1.6 0.5 - 2.0 mmol/L   POC UA    Collection Time: 03/23/17 11:03 PM   Result Value Ref Range    POC Color Yellow     POC Appearance Clear     POC Glucose Negative Negative mg/dL    POC Ketones Negative Negative mg/dL    POC Specific Gravity >=1.030 (A) 1.005-1.030    POC Blood Small (A) Negative    POC Urine PH 5.5 5.0-8.0    POC Protein Trace (A) Negative mg/dL    POC Nitrites Negative Negative    POC Leukocyte Esterase Small (A) Negative   POC URINALYSIS    Collection Time: 03/23/17 11:12 PM   Result Value Ref Range    POC Color yellow Negative    POC Appearance clear Negative    POC Glucose negative Negative mg/dL    POC Ketones negative Negative mg/dL    POC Specific Gravity >=1.030 <1.005 - >1.030    POC Blood small Negative    POC Urine PH 5.5 5.0 - 8.0    POC Protein trace Negative mg/dL    POC Nitrites negative Negative    POC Leukocyte Esterase small Negative       Imaging/Procedures Review:    ndependant Imaging Review: Completed  CT-RENAL COLIC EVALUATION(A/P W/O)   Final Result         1.  No acute abnormality.   2.  Groundglass pulmonary nodule, recommend follow-up CT chest in 3 months for evaluation of stability.   3.  Small bilateral fat-containing inguinal hernias.   4.  Atherosclerosis.   5.  Diverticulosis      These findings were discussed with the patient's clinician, Stanley Hunter, on 3/24/2017 1:22 AM.      DX-CHEST-PORTABLE (1 VIEW)   Final Result         1.  Left basilar atelectasis or scarring, no focal infiltrate. Exam similar to prior      CT-HEAD W/O    (Results Pending)            EKG:   EKG Independant Review: Completed  QTc:433, HR: 58, Normal Sinus Rhythm, no ST/T changes      Records reviewed and  summarized in current documentation             Assessment/Plan   Altered mental status secondary to possible sepsis UTI versus meningitis  She has baseline dementia without behavioral disturbances, usually she is agitated.    Presented with worsening drowsiness, incoherent speech since today morning, reduced oral intake, reduced urine output  /68 mmHg  Pulse 78  Temp(Src) 36.4 °C (97.5 °F) (Temporal)  Resp 18   SpO2 91%  WBC 16.1 with neutrophils 86% lactic acid 1.6  POC UA showed small leukocyte esterase, negative nitrites with small blood  Unable to obtain a sample of urine at the ED for UA  CT head-  Bilateral ventricular dilatation, appears increased since prior study. Could represent progressive atrophic changes, however appearance raises concern for component of hydrocephalus.  LP -declined by his significant other but agreeable with continuing antibiotics.    Troponin <0.01     Plan  IV ceftriaxone 2 g every 12 hours  IV ampicillin 500 mg every 4 hours  IV acyclovir 10 mg per KG every 8 hours  IV vancomycin per pharmacy  Neuro checks every 4 hours  Follow blood culture, urine culture, TSH, vitamin D, vitamin B12, folate, magnesium, phosphate, ketones, urine drug screen  IV normal saline 150 cc/h    Chronic kidney disease  Creatinine 1.01, GFR 53  Baseline creatinine around 1.0  Monitor BMP    Dementia  She has Alzheimer + vascular dementia  Seen by Dr. Chatman (psychiatry) for behavioral disturbances  We will hold off Namenda and Aricept for now, restart when stable  We will continue Seroquel    Other chronic medical problems   Vitamin B 12, D deficiency - continue supplements    Anticipated Hospital stay: inpatient        Quality Measures  EKG reviewed, Medications reviewed, Labs reviewed and Radiology images reviewed  Salcedo catheter: No Salcedo      DVT Prophylaxis: Heparin

## 2017-03-24 NOTE — CARE PLAN
Problem: Safety  Goal: Will remain free from falls  Intervention: Implement fall precautions  Bed alarm in place to reduce fall risk.      Problem: Infection  Goal: Will remain free from infection  Intervention: Implement standard precautions and perform hand washing before and after patient contact  Instruct pt/family on standard precautions to help prevent spread of infection/germs.

## 2017-03-24 NOTE — ASSESSMENT & PLAN NOTE
Initially admitted AMS which was beyond the baseline of her severe dementia. Medical workup was done to exclude causes of acute delirium. So far her LP, HSV, TSH, UA, RPR, B12 has been wnl. Her UDS was positive for Benzo. Head CT and MRI brain showed dilated ventricles and there was a questionable NPH but we were not able to further assess her gait.  She had some leukocytosis on admission without evident source of infection and she was treated with 5 days of Unasyn for a presumable aspiration pneumonia.  Now she seems to be at her baseline per her boyfriend. She has been calm recently but not oriented even to herself.   Currently she is awaiting placement. Her sister who is her legal next of kin (since she is not  to boyfriend) has met with our internal medicine team to discuss goals of care. She recommended DNR and focusing on quality of life since she has advanced dementia and her prognosis is poor. She recommended placing her in SNF and not sending her home with her boyfriend because she is concerned about un-safe home environment.     Plan  - Fall, seizures and aspiration precautions.  - Full liquid diet per speech recs  - PT/OT evaluation pending   - For possible wernicke's encephalopathy, s/p Thiamine 500mg TID x 2d. Started 250mg daily for 2 days and it was switched to oral thiamine.   - SW to follow up on SNF referral

## 2017-03-24 NOTE — ED NOTES
Pt resting comfortably on gurney. Pt with no changes from previous assessments. Respirations are even and unlabored. Bed in lowest position, call light within reach. Pt with no further needs at this time.   Restraints verified, CMS/ROM intact.  updated on POC and wait times.

## 2017-03-24 NOTE — ED NOTES
Chief Complaint   Patient presents with   • Altered Mental Status     severe dementia       Per SO pt has had rapid decline in strength and confusion over the last 24 hrs

## 2017-03-24 NOTE — PROCEDURES
Procedure - Lumbar Puncture  Indication - altered mental status  Time out performed verifying correct patient and site  Anesthesia - local 1% lidocaine   Patient unable to provide consent due to her medical condition, discussed need for procedure with significant other who is agreeable .The area was prepped anddraped in the usual sterile fashion. Using landmarks, a 22 guage spinal needle was inserted in the L4-L5 innerspace. The stylet was removed and the opening pressure was measured at 12 cm of water.8 Mls of clear fluid was collected and sent for routine studies. CSF was also sent for HSV  PCR. The patient tolerated the procedure well. There was no blood loss or hematoma.  Dr. Ruiz was present and helped throughout the entire procedure.

## 2017-03-24 NOTE — ED NOTES
Report received. Pt resting comfortably on gurney. Assessment completed. Respirations are even and unlabored. NAD. Bed in lowest position, call light within reach.  at bedside.   Pt ripped out PIV and is combative and not cooperative.

## 2017-03-24 NOTE — THERAPY
"Speech Language Therapy Clinical Swallow Evaluation completed.    Functional Status:  Patient awake, in bilateral wrist restraints, and unable to state name. Severe expressive deficits noted and characterized by jargon ~75% of the time, nonsensical phrases, appropriate inflection, and intermittently agrammatic syntax. Patient unable to follow oral motor commands and was unable to visualize oral cavity. Patient orally defensive with 4 trials of ice chips. PO trials of 3 1/4 tsp of pudding resulted in moderate-to-severe delay in swallow trigger despite max verbal cues to swallow. Laryngeal elevation and hyolaryngeal excursion weak upon palpation. Minimum trials this session 2/2 oral defensiveness and increased agitation. Patient stating \"get out of here\" and \"I don't like you.\"     Recommendations - Diet: At this time, patient is not at the level for PO d/t AMS. There is also concern patient would meet nutritional need via PO. Recommend NPO with alternative source of nutrition. Patient would benefit from cognitive-linguistic evaluation, when appropriate.                             Strategies: To be assessed                            Medication Administration: Via gastric tube     Plan of Care: Will benefit from Speech Therapy 3 times per week    Post-Acute Therapy: Discharge to a transitional care facility for continued skilled therapy services.    See \"Rehab Therapy-Acute\" Patient Summary Report for complete documentation. Thank you for the consult.      "

## 2017-03-25 ENCOUNTER — APPOINTMENT (OUTPATIENT)
Dept: RADIOLOGY | Facility: MEDICAL CENTER | Age: 77
DRG: 056 | End: 2017-03-25
Attending: INTERNAL MEDICINE
Payer: MEDICARE

## 2017-03-25 LAB
ALBUMIN SERPL BCP-MCNC: 3.6 G/DL (ref 3.2–4.9)
ALBUMIN/GLOB SERPL: 1.4 G/DL
ALP SERPL-CCNC: 71 U/L (ref 30–99)
ALT SERPL-CCNC: 13 U/L (ref 2–50)
ANION GAP SERPL CALC-SCNC: 10 MMOL/L (ref 0–11.9)
AST SERPL-CCNC: 23 U/L (ref 12–45)
BACTERIA UR CULT: NORMAL
BASOPHILS # BLD AUTO: 0.3 % (ref 0–1.8)
BASOPHILS # BLD: 0.04 K/UL (ref 0–0.12)
BILIRUB SERPL-MCNC: 0.6 MG/DL (ref 0.1–1.5)
BUN SERPL-MCNC: 13 MG/DL (ref 8–22)
CALCIUM SERPL-MCNC: 8.8 MG/DL (ref 8.5–10.5)
CHLORIDE SERPL-SCNC: 111 MMOL/L (ref 96–112)
CO2 SERPL-SCNC: 17 MMOL/L (ref 20–33)
CREAT SERPL-MCNC: 0.74 MG/DL (ref 0.5–1.4)
EOSINOPHIL # BLD AUTO: 0.03 K/UL (ref 0–0.51)
EOSINOPHIL NFR BLD: 0.2 % (ref 0–6.9)
ERYTHROCYTE [DISTWIDTH] IN BLOOD BY AUTOMATED COUNT: 41.9 FL (ref 35.9–50)
GFR SERPL CREATININE-BSD FRML MDRD: >60 ML/MIN/1.73 M 2
GLOBULIN SER CALC-MCNC: 2.5 G/DL (ref 1.9–3.5)
GLUCOSE BLD-MCNC: 79 MG/DL (ref 65–99)
GLUCOSE BLD-MCNC: 88 MG/DL (ref 65–99)
GLUCOSE SERPL-MCNC: 93 MG/DL (ref 65–99)
HCT VFR BLD AUTO: 36.5 % (ref 37–47)
HGB BLD-MCNC: 11.8 G/DL (ref 12–16)
HSV1+2 DNA SPEC QL NAA+PROBE: NORMAL
IMM GRANULOCYTES # BLD AUTO: 0.05 K/UL (ref 0–0.11)
IMM GRANULOCYTES NFR BLD AUTO: 0.4 % (ref 0–0.9)
LYMPHOCYTES # BLD AUTO: 2.22 K/UL (ref 1–4.8)
LYMPHOCYTES NFR BLD: 18.5 % (ref 22–41)
MCH RBC QN AUTO: 28.9 PG (ref 27–33)
MCHC RBC AUTO-ENTMCNC: 32.3 G/DL (ref 33.6–35)
MCV RBC AUTO: 89.2 FL (ref 81.4–97.8)
MONOCYTES # BLD AUTO: 0.93 K/UL (ref 0–0.85)
MONOCYTES NFR BLD AUTO: 7.7 % (ref 0–13.4)
NEUTROPHILS # BLD AUTO: 8.75 K/UL (ref 2–7.15)
NEUTROPHILS NFR BLD: 72.9 % (ref 44–72)
NRBC # BLD AUTO: 0 K/UL
NRBC BLD AUTO-RTO: 0 /100 WBC
PLATELET # BLD AUTO: 170 K/UL (ref 164–446)
PMV BLD AUTO: 10.8 FL (ref 9–12.9)
POTASSIUM SERPL-SCNC: 3.9 MMOL/L (ref 3.6–5.5)
PROT SERPL-MCNC: 6.1 G/DL (ref 6–8.2)
RBC # BLD AUTO: 4.09 M/UL (ref 4.2–5.4)
SIGNIFICANT IND 70042: NORMAL
SIGNIFICANT IND 70042: NORMAL
SITE SITE: NORMAL
SITE SITE: NORMAL
SODIUM SERPL-SCNC: 138 MMOL/L (ref 135–145)
SOURCE SOURCE: NORMAL
SOURCE SOURCE: NORMAL
WBC # BLD AUTO: 12 K/UL (ref 4.8–10.8)

## 2017-03-25 PROCEDURE — 700105 HCHG RX REV CODE 258: Performed by: INTERNAL MEDICINE

## 2017-03-25 PROCEDURE — 700102 HCHG RX REV CODE 250 W/ 637 OVERRIDE(OP): Performed by: INTERNAL MEDICINE

## 2017-03-25 PROCEDURE — 700117 HCHG RX CONTRAST REV CODE 255: Performed by: INTERNAL MEDICINE

## 2017-03-25 PROCEDURE — 70553 MRI BRAIN STEM W/O & W/DYE: CPT

## 2017-03-25 PROCEDURE — 85025 COMPLETE CBC W/AUTO DIFF WBC: CPT

## 2017-03-25 PROCEDURE — 36415 COLL VENOUS BLD VENIPUNCTURE: CPT

## 2017-03-25 PROCEDURE — 700111 HCHG RX REV CODE 636 W/ 250 OVERRIDE (IP): Performed by: INTERNAL MEDICINE

## 2017-03-25 PROCEDURE — 99232 SBSQ HOSP IP/OBS MODERATE 35: CPT | Mod: GC | Performed by: INTERNAL MEDICINE

## 2017-03-25 PROCEDURE — A9270 NON-COVERED ITEM OR SERVICE: HCPCS | Performed by: INTERNAL MEDICINE

## 2017-03-25 PROCEDURE — 80053 COMPREHEN METABOLIC PANEL: CPT

## 2017-03-25 PROCEDURE — 770020 HCHG ROOM/CARE - TELE (206)

## 2017-03-25 PROCEDURE — 82962 GLUCOSE BLOOD TEST: CPT | Mod: 91

## 2017-03-25 PROCEDURE — A9579 GAD-BASE MR CONTRAST NOS,1ML: HCPCS | Performed by: INTERNAL MEDICINE

## 2017-03-25 RX ORDER — ASPIRIN 300 MG/1
300 SUPPOSITORY RECTAL DAILY
Status: DISCONTINUED | OUTPATIENT
Start: 2017-03-25 | End: 2017-03-27

## 2017-03-25 RX ADMIN — ACYCLOVIR SODIUM 547 MG: 500 INJECTION, SOLUTION INTRAVENOUS at 05:23

## 2017-03-25 RX ADMIN — ACYCLOVIR SODIUM 547 MG: 500 INJECTION, SOLUTION INTRAVENOUS at 20:48

## 2017-03-25 RX ADMIN — LORAZEPAM 1 MG: 2 INJECTION INTRAMUSCULAR; INTRAVENOUS at 08:53

## 2017-03-25 RX ADMIN — ASPIRIN 300 MG: 300 SUPPOSITORY RECTAL at 12:26

## 2017-03-25 RX ADMIN — SODIUM CHLORIDE: 9 INJECTION, SOLUTION INTRAVENOUS at 05:17

## 2017-03-25 RX ADMIN — AMPICILLIN SODIUM AND SULBACTAM SODIUM 3 G: 2; 1 INJECTION, POWDER, FOR SOLUTION INTRAMUSCULAR; INTRAVENOUS at 23:09

## 2017-03-25 RX ADMIN — HALOPERIDOL LACTATE 1 MG: 5 INJECTION, SOLUTION INTRAMUSCULAR at 09:46

## 2017-03-25 RX ADMIN — ACYCLOVIR SODIUM 547 MG: 500 INJECTION, SOLUTION INTRAVENOUS at 15:41

## 2017-03-25 RX ADMIN — HALOPERIDOL LACTATE 1 MG: 5 INJECTION, SOLUTION INTRAMUSCULAR at 20:53

## 2017-03-25 RX ADMIN — GADODIAMIDE 10 ML: 287 INJECTION INTRAVENOUS at 10:16

## 2017-03-25 RX ADMIN — HEPARIN SODIUM 5000 UNITS: 5000 INJECTION INTRAVENOUS; SUBCUTANEOUS at 15:41

## 2017-03-25 RX ADMIN — AMPICILLIN SODIUM AND SULBACTAM SODIUM 3 G: 2; 1 INJECTION, POWDER, FOR SOLUTION INTRAMUSCULAR; INTRAVENOUS at 19:13

## 2017-03-25 RX ADMIN — AMPICILLIN SODIUM AND SULBACTAM SODIUM 3 G: 2; 1 INJECTION, POWDER, FOR SOLUTION INTRAMUSCULAR; INTRAVENOUS at 12:26

## 2017-03-25 RX ADMIN — HEPARIN SODIUM 5000 UNITS: 5000 INJECTION INTRAVENOUS; SUBCUTANEOUS at 05:23

## 2017-03-25 RX ADMIN — SODIUM CHLORIDE: 9 INJECTION, SOLUTION INTRAVENOUS at 19:14

## 2017-03-25 RX ADMIN — AMPICILLIN SODIUM AND SULBACTAM SODIUM 3 G: 2; 1 INJECTION, POWDER, FOR SOLUTION INTRAMUSCULAR; INTRAVENOUS at 05:18

## 2017-03-25 RX ADMIN — HEPARIN SODIUM 5000 UNITS: 5000 INJECTION INTRAVENOUS; SUBCUTANEOUS at 20:49

## 2017-03-25 ASSESSMENT — PAIN SCALES - GENERAL
PAINLEVEL_OUTOF10: 0

## 2017-03-25 NOTE — PROGRESS NOTES
Report received from RN. Pt is confused and only able to respond to name. Speech is clear but garbled. Call light is within reach, but patient does not call. Hourly rounding in place.

## 2017-03-25 NOTE — DISCHARGE PLANNING
"JOSÉ MANUEL received order to locate NOK. Per MD notes, \"Patient has no children but does have a sister, Deepthi Velásquez P: [phone] (home), [phone] (work).\" JOSÉ MANUEL unable to follow up at this time, will update JOSÉ MANUEL for tomorrow.   "

## 2017-03-25 NOTE — PROGRESS NOTES
Spoke with Dr. Ruiz in regards to nutrition. Per MD, continue NPO until tomorrow for another swallow eval with speech, and then depending on results, consider tube feeds if still not able to pass eval. Pt/cg informed, all questions answered, call light within reach.

## 2017-03-25 NOTE — PROGRESS NOTES
[facility] [place]    Name Audrey Gauthier     1940   Age/Sex 77 y.o. female   MRN [id number]   Code Status FULL     After 5PM or if no immediate response to page, please call for cross-coverage  Attending/Team: Dr Ramírez / kecia  Call [phone] to page   1st Call - Day Intern (R1):   SOTERO Patel 2nd Call - Day Sr. Resident (R2/R3):   Dr Ruiz         Chief complaint/ reason for interval visit (Primary Diagnosis)   Altered mental status    Interval Problem Daily Status Update    Subjective: AAO x 0, slightly calmer compared to yesterday. Still unable to participate in physical exam. Leucocytosis resolving. MRI showing changes suggestive of NPH.          Review of Systems   Unable to perform ROS: medical condition       Consultants/Specialty  None    Disposition  Inpatient for AMS evaluation and management    Quality Measures  Labs reviewed, Medications reviewed, EKG reviewed and Radiology images reviewed  Salcedo catheter: No Salcedo      DVT Prophylaxis: Heparin    Ulcer prophylaxis: Not indicated  : yes.            Physical Exam       Filed Vitals:    17 0000 17 0400 17 0800 17 1200   BP: 122/64 130/63 134/67 105/56   Pulse: 60 63 59 54   Temp: 36.6 °C (97.9 °F) 36.6 °C (97.8 °F) 36.7 °C (98.1 °F) 36.2 °C (97.1 °F)   TempSrc:       Resp: 16 16 16 18   Height:       Weight:       SpO2: 96% 94% 95% 96%     Body mass index is 19.67 kg/(m^2).    Oxygen Therapy:  Pulse Oximetry: 96 %, O2 (LPM): 0, O2 Delivery: None (Room Air)    Physical Exam   Constitutional:   Patient is altered.   HENT:   Head: Normocephalic and atraumatic.   Eyes: EOM are normal.   Pupils are less reactive and mildly dilated.    Neck: Normal range of motion. Neck supple.   She resists movement of the neck   Cardiovascular: Normal rate and regular rhythm.  Exam reveals no gallop and no friction rub.    No murmur heard.  Pulmonary/Chest: Effort normal and breath sounds  normal. She has no wheezes. She exhibits no tenderness.   Abdominal: Soft. Bowel sounds are normal. She exhibits no distension. There is no tenderness. There is no rebound.   Musculoskeletal: Normal range of motion.   Neurological:   AAO x O  Speech: Fluent but word salad  Strength: unable to assess but patient moving 4 extremities  Reflexes: 2+ in knee, ankle, brachialis and brachioradialis.  Babinski: withdraw    Skin: Skin is warm.         Lab Data Review:      3/24/2017  1:17 PM    Recent Labs      03/23/17 1955 03/25/17   0328   SODIUM  139  138   POTASSIUM  3.8  3.9   CHLORIDE  109  111   CO2  17*  17*   BUN  15  13   CREATININE  1.01  0.74   MAGNESIUM  2.5   --    PHOSPHORUS  2.6   --    CALCIUM  10.1  8.8       Recent Labs      03/23/17 1955 03/25/17   0328   ALTSGPT  16  13   ASTSGOT  27  23   ALKPHOSPHAT  91  71   TBILIRUBIN  0.5  0.6   GLUCOSE  111*  93       Recent Labs      03/23/17 1955 03/25/17   0328   RBC  5.11  4.09*   HEMOGLOBIN  14.6  11.8*   HEMATOCRIT  44.4  36.5*   PLATELETCT  146*  170       Recent Labs      03/23/17 1955 03/25/17   0328   WBC  16.1*  12.0*   NEUTSPOLYS  86.90*  72.90*   LYMPHOCYTES  7.60*  18.50*   MONOCYTES  4.30  7.70   EOSINOPHILS  0.20  0.20   BASOPHILS  0.30  0.30   ASTSGOT  27  23   ALTSGPT  16  13   ALKPHOSPHAT  91  71   TBILIRUBIN  0.5  0.6           Assessment/Plan     Altered mental status  - Pt was admitted for acute onset of AMS.   - On exam: word salad and normal motor exam  - WBC16 with left shift. Down to 12 today. UA wnl. Normal B12 and TSH. borderline Vit D. UDS positive for benzos.   - CT scan:  Bilateral ventricular dilatation, appears increased since prior study.  - MRI: changes suggestive of possible hydrocephalus. No evidence of stroke.  - CXR, CT abdomen wnl.   - LP (3/24): CSF: OP: 12, Glucose 75, TP 41, no organisms. HSV pending   - Got vanco, C3, ampicillin and decadron on admission.   DDx: NPH vs encephalitis / viral meningitis .  Bacterial meningitis and metabolic causes are almost ruled out.   Plan  - Fall, seizures and aspiration precautions, Q4 neurochecks.   - Aspirin and atorvastatin (unable to swallow)  - PT/OT/SLP  - unasyn for possible aspiration PNA.   - Considering the age and advanced dementia with inability to make decisions, we need to find the next of kin or the POA to decide the best way to proceed. I would hold on consulting neurosurgery for possible NPH until we set the goals of care with the next of kin. Patient has no children but does have a sister, Deepthi Velásquez P: [phone] (home), [phone] (work). Could not reach, left a message.     Vascular dementia with behavior disturbance  - has h/o dementia ( alziemer's and/or vascular).  - H/o agitation in the past  - Held memantine and donepezil for now.     Leucocytosis  - leucocytosis with left shift. Trending down.  - UA, CXR, abd CT wnl.   - ?Aspiration  Plan  - CTM  - Will continue ceftriaxone for now     Hyperlipidemia  - Switched simvastatin to lipitor 80mg (patient not taking any pending swallow eval and ability to participate)    Vitamin D deficiency  - Vit D 26  - will start vit d supplements when she is cleared by speech    CKD (chronic kidney disease) stage 3, GFR 30-59 ml/min  - Stable  - CTM    Overactive bladder  - held tolterodine for now

## 2017-03-26 PROBLEM — E87.6 HYPOKALEMIA: Status: ACTIVE | Noted: 2017-03-26

## 2017-03-26 LAB
ALBUMIN SERPL BCP-MCNC: 3.7 G/DL (ref 3.2–4.9)
ALBUMIN/GLOB SERPL: 1.4 G/DL
ALP SERPL-CCNC: 69 U/L (ref 30–99)
ALT SERPL-CCNC: 18 U/L (ref 2–50)
ANION GAP SERPL CALC-SCNC: 10 MMOL/L (ref 0–11.9)
AST SERPL-CCNC: 19 U/L (ref 12–45)
BASOPHILS # BLD AUTO: 0.6 % (ref 0–1.8)
BASOPHILS # BLD: 0.07 K/UL (ref 0–0.12)
BILIRUB SERPL-MCNC: 0.6 MG/DL (ref 0.1–1.5)
BUN SERPL-MCNC: 10 MG/DL (ref 8–22)
CALCIUM SERPL-MCNC: 9.1 MG/DL (ref 8.5–10.5)
CHLORIDE SERPL-SCNC: 107 MMOL/L (ref 96–112)
CO2 SERPL-SCNC: 21 MMOL/L (ref 20–33)
CREAT SERPL-MCNC: 0.74 MG/DL (ref 0.5–1.4)
EOSINOPHIL # BLD AUTO: 0.23 K/UL (ref 0–0.51)
EOSINOPHIL NFR BLD: 2.1 % (ref 0–6.9)
ERYTHROCYTE [DISTWIDTH] IN BLOOD BY AUTOMATED COUNT: 39.1 FL (ref 35.9–50)
GFR SERPL CREATININE-BSD FRML MDRD: >60 ML/MIN/1.73 M 2
GLOBULIN SER CALC-MCNC: 2.7 G/DL (ref 1.9–3.5)
GLUCOSE SERPL-MCNC: 83 MG/DL (ref 65–99)
HCT VFR BLD AUTO: 35.8 % (ref 37–47)
HGB BLD-MCNC: 12.2 G/DL (ref 12–16)
IMM GRANULOCYTES # BLD AUTO: 0.04 K/UL (ref 0–0.11)
IMM GRANULOCYTES NFR BLD AUTO: 0.4 % (ref 0–0.9)
LYMPHOCYTES # BLD AUTO: 2.9 K/UL (ref 1–4.8)
LYMPHOCYTES NFR BLD: 26.7 % (ref 22–41)
MAGNESIUM SERPL-MCNC: 2.1 MG/DL (ref 1.5–2.5)
MCH RBC QN AUTO: 28.7 PG (ref 27–33)
MCHC RBC AUTO-ENTMCNC: 34.1 G/DL (ref 33.6–35)
MCV RBC AUTO: 84.2 FL (ref 81.4–97.8)
MONOCYTES # BLD AUTO: 0.85 K/UL (ref 0–0.85)
MONOCYTES NFR BLD AUTO: 7.8 % (ref 0–13.4)
NEUTROPHILS # BLD AUTO: 6.79 K/UL (ref 2–7.15)
NEUTROPHILS NFR BLD: 62.4 % (ref 44–72)
NRBC # BLD AUTO: 0 K/UL
NRBC BLD AUTO-RTO: 0 /100 WBC
PLATELET # BLD AUTO: 183 K/UL (ref 164–446)
PMV BLD AUTO: 10 FL (ref 9–12.9)
POTASSIUM SERPL-SCNC: 3.6 MMOL/L (ref 3.6–5.5)
PROT SERPL-MCNC: 6.4 G/DL (ref 6–8.2)
RBC # BLD AUTO: 4.25 M/UL (ref 4.2–5.4)
SODIUM SERPL-SCNC: 138 MMOL/L (ref 135–145)
TREPONEMA PALLIDUM IGG+IGM AB [PRESENCE] IN SERUM OR PLASMA BY IMMUNOASSAY: NON REACTIVE
WBC # BLD AUTO: 10.9 K/UL (ref 4.8–10.8)

## 2017-03-26 PROCEDURE — 92526 ORAL FUNCTION THERAPY: CPT

## 2017-03-26 PROCEDURE — 83735 ASSAY OF MAGNESIUM: CPT

## 2017-03-26 PROCEDURE — 700101 HCHG RX REV CODE 250: Performed by: INTERNAL MEDICINE

## 2017-03-26 PROCEDURE — 770020 HCHG ROOM/CARE - TELE (206)

## 2017-03-26 PROCEDURE — 93880 EXTRACRANIAL BILAT STUDY: CPT | Mod: 26 | Performed by: SURGERY

## 2017-03-26 PROCEDURE — 99231 SBSQ HOSP IP/OBS SF/LOW 25: CPT | Mod: GC | Performed by: INTERNAL MEDICINE

## 2017-03-26 PROCEDURE — 700105 HCHG RX REV CODE 258: Performed by: INTERNAL MEDICINE

## 2017-03-26 PROCEDURE — 700102 HCHG RX REV CODE 250 W/ 637 OVERRIDE(OP): Performed by: INTERNAL MEDICINE

## 2017-03-26 PROCEDURE — 36415 COLL VENOUS BLD VENIPUNCTURE: CPT

## 2017-03-26 PROCEDURE — 86780 TREPONEMA PALLIDUM: CPT

## 2017-03-26 PROCEDURE — 93880 EXTRACRANIAL BILAT STUDY: CPT

## 2017-03-26 PROCEDURE — A9270 NON-COVERED ITEM OR SERVICE: HCPCS | Performed by: INTERNAL MEDICINE

## 2017-03-26 PROCEDURE — 700111 HCHG RX REV CODE 636 W/ 250 OVERRIDE (IP): Performed by: INTERNAL MEDICINE

## 2017-03-26 PROCEDURE — 80053 COMPREHEN METABOLIC PANEL: CPT

## 2017-03-26 PROCEDURE — 85025 COMPLETE CBC W/AUTO DIFF WBC: CPT

## 2017-03-26 RX ORDER — POTASSIUM CHLORIDE 1.5 G/1.58G
40 POWDER, FOR SOLUTION ORAL ONCE
Status: DISCONTINUED | OUTPATIENT
Start: 2017-03-26 | End: 2017-03-27

## 2017-03-26 RX ORDER — LABETALOL HYDROCHLORIDE 5 MG/ML
10 INJECTION, SOLUTION INTRAVENOUS
Status: DISCONTINUED | OUTPATIENT
Start: 2017-03-26 | End: 2017-03-28

## 2017-03-26 RX ADMIN — ASPIRIN 300 MG: 300 SUPPOSITORY RECTAL at 09:32

## 2017-03-26 RX ADMIN — AMPICILLIN SODIUM AND SULBACTAM SODIUM 3 G: 2; 1 INJECTION, POWDER, FOR SOLUTION INTRAMUSCULAR; INTRAVENOUS at 05:24

## 2017-03-26 RX ADMIN — AMPICILLIN SODIUM AND SULBACTAM SODIUM 3 G: 2; 1 INJECTION, POWDER, FOR SOLUTION INTRAMUSCULAR; INTRAVENOUS at 14:19

## 2017-03-26 RX ADMIN — ACYCLOVIR SODIUM 547 MG: 500 INJECTION, SOLUTION INTRAVENOUS at 05:24

## 2017-03-26 RX ADMIN — AMPICILLIN SODIUM AND SULBACTAM SODIUM 3 G: 2; 1 INJECTION, POWDER, FOR SOLUTION INTRAMUSCULAR; INTRAVENOUS at 23:51

## 2017-03-26 RX ADMIN — SODIUM CHLORIDE: 9 INJECTION, SOLUTION INTRAVENOUS at 09:45

## 2017-03-26 RX ADMIN — HEPARIN SODIUM 5000 UNITS: 5000 INJECTION INTRAVENOUS; SUBCUTANEOUS at 20:33

## 2017-03-26 RX ADMIN — HEPARIN SODIUM 5000 UNITS: 5000 INJECTION INTRAVENOUS; SUBCUTANEOUS at 05:24

## 2017-03-26 RX ADMIN — HEPARIN SODIUM 5000 UNITS: 5000 INJECTION INTRAVENOUS; SUBCUTANEOUS at 15:37

## 2017-03-26 RX ADMIN — AMPICILLIN SODIUM AND SULBACTAM SODIUM 3 G: 2; 1 INJECTION, POWDER, FOR SOLUTION INTRAMUSCULAR; INTRAVENOUS at 20:11

## 2017-03-26 RX ADMIN — THIAMINE HYDROCHLORIDE 500 MG: 100 INJECTION, SOLUTION INTRAMUSCULAR; INTRAVENOUS at 15:38

## 2017-03-26 RX ADMIN — SODIUM CHLORIDE: 9 INJECTION, SOLUTION INTRAVENOUS at 21:44

## 2017-03-26 RX ADMIN — LABETALOL HYDROCHLORIDE 10 MG: 5 INJECTION, SOLUTION INTRAVENOUS at 21:40

## 2017-03-26 RX ADMIN — THIAMINE HYDROCHLORIDE 500 MG: 100 INJECTION, SOLUTION INTRAMUSCULAR; INTRAVENOUS at 21:46

## 2017-03-26 RX ADMIN — HALOPERIDOL LACTATE 1 MG: 5 INJECTION, SOLUTION INTRAMUSCULAR at 20:33

## 2017-03-26 ASSESSMENT — PAIN SCALES - GENERAL: PAINLEVEL_OUTOF10: 0

## 2017-03-26 NOTE — PROGRESS NOTES
Pt alert, confused and talking in word salad. Pt climbing out of bed on multiple occassions. Lap belt placed with bed change. Bed in low position. Exit alarm in place.

## 2017-03-26 NOTE — THERAPY
Attempted to see pt for swallow reassessment as pt continues to be without source of nutrition.  Pt does not open eyes or mouth despite max cues, although will respond to some questions with confabulatory remarks.   Unclear as to whether current mentation is baseline or not per RN/chart notes.  RN to discuss with boyfriend later today.  SLP to reattempt as able, however if pt is not at baseline status consider previous recommendation of NPO/Cortrak.

## 2017-03-26 NOTE — CARE PLAN
Problem: Communication  Goal: The ability to communicate needs accurately and effectively will improve  Intervention: [place] patient and significant other/support system to call light to alert staff of needs  Educated pt's significant other on using call light. Call light within reach, hourly rounding in place.

## 2017-03-26 NOTE — PROGRESS NOTES
Monitor summary: SR-SB 51-60, MI .15, QRS .08, QT .47, per strip from monitor room.  Amilcar to 44

## 2017-03-26 NOTE — DISCHARGE PLANNING
"Medical Social Work    Follow Up:  JOSÉ MANUEL paged by bedside RN regarding request for consult with S.O.  JOSÉ MANUEL reviewed chart and met with pt's S.O., Jhonny, at bedside.  Jhonny states he provides care for pt at home and is able to meet pt's needs.  Bedside RN discussed medical concerns re: swallowing and IV abx, Jhonny continues to be adamant about being able to provide care.      JOSÉ MANUEL met with Jhonny outside of room.  Jhonny states that he has been attempting to obtain guardianship for the past 8 months, he has an  and that at the time they began the process pt was \"more clear.\"  Jhonny states they are still working on guardianship and he plans on calling the  tomorrow regarding guardianship and common law in [place].  Jhonny states that pt's sister, rBenda Morelos, has contested the guardianship and wants to be co-guardianship.  Jhonny is concerned that Brenda does not have pt's best interest in mind.  JOSÉ MANUEL explained that legally, Brenda has decision making rights at this time and all medical and d/c plans will go through her.  Jhonny states understanding and frustration to it.      Jhonny states that he is meeting with [facility] tomorrow to see if they can arrange for 24hr care.  Jhonny further states he is going to investigate [facility] to have more support.    JOSÉ MANUEL provided unit  phone number to Jhonny.  He states he will contact regarding d/c plan and resources.    "

## 2017-03-26 NOTE — THERAPY
"Speech Language Therapy dysphagia treatment completed.   Functional Status:  Patient was seen for a repeat clinical bedside swallow evaluation. Pt currently with NN. Pt confused, agitated, orally defensive and not following commands. Patient consumed limited PO trials of NTL and puree with significant oral aversion and coaxing to consume PO. RN present during assessment. Pt would not consume further PO for RN either. Pt refusing and become increasingly agitated with stimulation. At this time patient would be unable to meet nutritional needs alone via PO and is at high risk for aspiration 2/2 to AMS. Recommend NPO/Cortrak. SLP to continue to follow for prefeeding trials, consider trial tray NTFL. Will defer to MD.   Recommendations: Consider NPO/Cortrak, defer to MD. Pt refusing all PO intake.   Plan of Care: Will benefit from Speech Therapy 3 times per week  Post-Acute Therapy: Discharge to a transitional care facility for continued skilled therapy services.    See \"Rehab Therapy-Acute\" Patient Summary Report for complete documentation.     "

## 2017-03-26 NOTE — PROGRESS NOTES
[facility] [place] [facility] Medicine Interval Note    Name Audrey Gauthier     1940   Age/Sex 77 y.o. female   MRN [id number]   Code Status FULL     After 5PM or if no immediate response to page, please call for cross-coverage  Attending/Team: Dr Ramírez / kecia  Call [phone] to page   1st Call - Day Intern (R1):   SOTERO Patel 2nd Call - Day Sr. Resident (R2/R3):   Dr Ruiz         Chief complaint/ reason for interval visit (Primary Diagnosis)   Altered mental status/ ?NPH vs chronic dementia    Interval Problem Daily Status Update    Subjective: AAO x 0, slightly calmer compared to yesterday. Still unable to participate in physical exam.    - HSV PCR neg, stopped acyclovir  - Since there is h/o alcoholism in the past, she might have wernicke's encephalopathy. Started thiamine 500mg TID for 2 days.   - Spoke to her sister at length about the patient's current status. She does not know the baseline mental status as patient lives with her boyfriend but she does know that it is severe dementia. She expressed concern that patient's boyfriend is not able to care for he well and she does not want her sister to be sent home with his boyfriend. Guardian status b/w her and patient's boyfriend is in the court, in the meanwhile she is ready to step in to make decisions till the court hearing. Patient does not have kids and . She also stated that elderly protection services are following patient's condition.  - SLP will decide on Cortrak   - Replaced 40meq KCL. K 3.6        Review of Systems   Unable to perform ROS: medical condition       Consultants/Specialty  None    Disposition  Inpatient for AMS evaluation and management    Quality Measures  Labs reviewed, Medications reviewed, EKG reviewed and Radiology images reviewed  Salcedo catheter: No Salcedo      DVT Prophylaxis: Heparin    Ulcer prophylaxis: Not indicated  : yes.            Physical Exam       Filed Vitals:    17  0000 03/26/17 0400 03/26/17 0809   BP: 200/68 167/61 170/64 181/52   Pulse: 66 67 64 55   Temp: 36.3 °C (97.3 °F) 36.3 °C (97.3 °F) 36.5 °C (97.7 °F) 36.9 °C (98.5 °F)   TempSrc:       Resp: 17 17 17 17   Height:       Weight:       SpO2: 96% 96% 99% 96%     Body mass index is 19.67 kg/(m^2).    Oxygen Therapy:  Pulse Oximetry: 96 %, O2 (LPM): 0, O2 Delivery: None (Room Air)    Physical Exam   Constitutional:   Patient is altered.   HENT:   Head: Normocephalic and atraumatic.   Eyes: EOM are normal.   Pupils are less reactive and mildly dilated.    Neck: Normal range of motion. Neck supple.   She resists movement of the neck   Cardiovascular: Normal rate and regular rhythm.  Exam reveals no gallop and no friction rub.    No murmur heard.  Pulmonary/Chest: Effort normal and breath sounds normal. She has no wheezes. She exhibits no tenderness.   Abdominal: Soft. Bowel sounds are normal. She exhibits no distension. There is no tenderness. There is no rebound.   Musculoskeletal: Normal range of motion.   Neurological:   AAO x O  Speech: Fluent but word salad  Strength: unable to assess but patient moving 4 extremities  Reflexes: 2+ in knee, ankle, brachialis and brachioradialis.  Babinski: withdrawal   Skin: Skin is warm.         Lab Data Review:      3/24/2017  1:17 PM    Recent Labs      03/23/17 1955 03/25/17 0328 03/26/17 0419   SODIUM  139  138  138   POTASSIUM  3.8  3.9  3.6   CHLORIDE  109  111  107   CO2  17*  17*  21   BUN  15  13  10   CREATININE  1.01  0.74  0.74   MAGNESIUM  2.5   --   2.1   PHOSPHORUS  2.6   --    --    CALCIUM  10.1  8.8  9.1       Recent Labs      03/23/17 1955 03/25/17 0328 03/26/17 0419   ALTSGPT  16  13  18   ASTSGOT  27  23  19   ALKPHOSPHAT  91  71  69   TBILIRUBIN  0.5  0.6  0.6   GLUCOSE  111*  93  83       Recent Labs      03/23/17 1955 03/25/17 0328 03/26/17   0419   RBC  5.11  4.09*  4.25   HEMOGLOBIN  14.6  11.8*  12.2   HEMATOCRIT  44.4  36.5*  35.8*    PLATELETCT  146*  170  183       Recent Labs      03/23/17 1955 03/25/17   0328  03/26/17   0419   WBC  16.1*  12.0*  10.9*   NEUTSPOLYS  86.90*  72.90*  62.40   LYMPHOCYTES  7.60*  18.50*  26.70   MONOCYTES  4.30  7.70  7.80   EOSINOPHILS  0.20  0.20  2.10   BASOPHILS  0.30  0.30  0.60   ASTSGOT  27  23  19   ALTSGPT  16  13  18   ALKPHOSPHAT  91  71  69   TBILIRUBIN  0.5  0.6  0.6           Assessment/Plan     Altered mental status  Assessment & Plan  - Pt was admitted for acute onset of AMS. He baseline is: demented but was not confused, she walked around  - On exam: word salad and normal motor exam  - WBC16 with left shift. UA wnl. Normal B12 and TSH. borderline Vit D. UDS positive for benzos.   - CT scan:  Bilateral ventricular dilatation, appears increased since prior study.  - CXR, CT abdomen wnl.   - LP (3/24): CSF: OP: [id number], Glucose 75, TP 41, no organisms. HSV pending   - MRI shows signs suggestive of NPH and significant atrophy.   - Got vanco, C3, ampicillin and decadron on admission.   DDx: Encephalitis, stroke, meningitis is ruled out. NPH vs wernicke's encephalopathy vs worsening dementia.   Plan  - Fall, seizures and aspiration precautions, Q4 neurochecks.   - Aspirin and atorvastatin  - PT/OT/SLP  - RPR pending  - Will consult neurology if necessary  - Thiamine 500mg TID for possible wernicke's encephalopathy  - Changed ceftriaxone to unasyn for possible aspiration PNA.   - Will talk to Dr Saenz tomorrow    Vascular dementia with behavior disturbance  Assessment & Plan  - has h/o dementia ( alziemer's and/or vascular).  - H/o agitation in the past  - Held memantine and donepezil for now.     Leucocytosis  Assessment & Plan  - leucocytosis with left shift  - UA, CXR, abd CT wnl.   - ?Aspiration  Plan  - CTM  - Will continue ceftriaxone for now     Hyperlipidemia  Assessment & Plan  - Switched simvastatin to lipitor 80mg    B12 deficiency  Assessment & Plan  - Normal B12    Vitamin D  deficiency  Assessment & Plan  - Vit D 26  - will start vit d supplements when she is cleared by speech    CKD (chronic kidney disease) stage 3, GFR 30-59 ml/min  Assessment & Plan  - Stable  - CTM    Overactive bladder  Assessment & Plan  - held tolterodine for now

## 2017-03-27 PROBLEM — I10 HYPERTENSION: Status: ACTIVE | Noted: 2017-03-27

## 2017-03-27 LAB
ANION GAP SERPL CALC-SCNC: 12 MMOL/L (ref 0–11.9)
ANION GAP SERPL CALC-SCNC: 13 MMOL/L (ref 0–11.9)
BACTERIA CSF CULT: NORMAL
BASOPHILS # BLD AUTO: 0.6 % (ref 0–1.8)
BASOPHILS # BLD: 0.06 K/UL (ref 0–0.12)
BUN SERPL-MCNC: 8 MG/DL (ref 8–22)
BUN SERPL-MCNC: 9 MG/DL (ref 8–22)
CALCIUM SERPL-MCNC: 9.1 MG/DL (ref 8.5–10.5)
CALCIUM SERPL-MCNC: 9.7 MG/DL (ref 8.5–10.5)
CHLORIDE SERPL-SCNC: 105 MMOL/L (ref 96–112)
CHLORIDE SERPL-SCNC: 106 MMOL/L (ref 96–112)
CO2 SERPL-SCNC: 20 MMOL/L (ref 20–33)
CO2 SERPL-SCNC: 20 MMOL/L (ref 20–33)
CREAT SERPL-MCNC: 0.78 MG/DL (ref 0.5–1.4)
CREAT SERPL-MCNC: 0.78 MG/DL (ref 0.5–1.4)
EOSINOPHIL # BLD AUTO: 0.24 K/UL (ref 0–0.51)
EOSINOPHIL NFR BLD: 2.5 % (ref 0–6.9)
ERYTHROCYTE [DISTWIDTH] IN BLOOD BY AUTOMATED COUNT: 38.5 FL (ref 35.9–50)
GFR SERPL CREATININE-BSD FRML MDRD: >60 ML/MIN/1.73 M 2
GFR SERPL CREATININE-BSD FRML MDRD: >60 ML/MIN/1.73 M 2
GLUCOSE SERPL-MCNC: 113 MG/DL (ref 65–99)
GLUCOSE SERPL-MCNC: 81 MG/DL (ref 65–99)
GRAM STN SPEC: NORMAL
HCT VFR BLD AUTO: 34.1 % (ref 37–47)
HGB BLD-MCNC: 11.6 G/DL (ref 12–16)
IMM GRANULOCYTES # BLD AUTO: 0.04 K/UL (ref 0–0.11)
IMM GRANULOCYTES NFR BLD AUTO: 0.4 % (ref 0–0.9)
LYMPHOCYTES # BLD AUTO: 2.79 K/UL (ref 1–4.8)
LYMPHOCYTES NFR BLD: 29.2 % (ref 22–41)
MCH RBC QN AUTO: 28.4 PG (ref 27–33)
MCHC RBC AUTO-ENTMCNC: 34 G/DL (ref 33.6–35)
MCV RBC AUTO: 83.6 FL (ref 81.4–97.8)
MONOCYTES # BLD AUTO: 1.16 K/UL (ref 0–0.85)
MONOCYTES NFR BLD AUTO: 12.1 % (ref 0–13.4)
NEUTROPHILS # BLD AUTO: 5.28 K/UL (ref 2–7.15)
NEUTROPHILS NFR BLD: 55.2 % (ref 44–72)
NRBC # BLD AUTO: 0 K/UL
NRBC BLD AUTO-RTO: 0 /100 WBC
PLATELET # BLD AUTO: 195 K/UL (ref 164–446)
PMV BLD AUTO: 9.9 FL (ref 9–12.9)
POTASSIUM SERPL-SCNC: 3 MMOL/L (ref 3.6–5.5)
POTASSIUM SERPL-SCNC: 3.5 MMOL/L (ref 3.6–5.5)
RBC # BLD AUTO: 4.08 M/UL (ref 4.2–5.4)
SIGNIFICANT IND 70042: NORMAL
SITE SITE: NORMAL
SODIUM SERPL-SCNC: 137 MMOL/L (ref 135–145)
SODIUM SERPL-SCNC: 139 MMOL/L (ref 135–145)
SOURCE SOURCE: NORMAL
WBC # BLD AUTO: 9.6 K/UL (ref 4.8–10.8)

## 2017-03-27 PROCEDURE — 700111 HCHG RX REV CODE 636 W/ 250 OVERRIDE (IP): Performed by: INTERNAL MEDICINE

## 2017-03-27 PROCEDURE — 92526 ORAL FUNCTION THERAPY: CPT

## 2017-03-27 PROCEDURE — 770020 HCHG ROOM/CARE - TELE (206)

## 2017-03-27 PROCEDURE — A9270 NON-COVERED ITEM OR SERVICE: HCPCS | Performed by: INTERNAL MEDICINE

## 2017-03-27 PROCEDURE — 99231 SBSQ HOSP IP/OBS SF/LOW 25: CPT | Mod: GC | Performed by: INTERNAL MEDICINE

## 2017-03-27 PROCEDURE — 700102 HCHG RX REV CODE 250 W/ 637 OVERRIDE(OP): Performed by: INTERNAL MEDICINE

## 2017-03-27 PROCEDURE — 302146: Performed by: INTERNAL MEDICINE

## 2017-03-27 PROCEDURE — 36415 COLL VENOUS BLD VENIPUNCTURE: CPT

## 2017-03-27 PROCEDURE — 700101 HCHG RX REV CODE 250: Performed by: INTERNAL MEDICINE

## 2017-03-27 PROCEDURE — 85025 COMPLETE CBC W/AUTO DIFF WBC: CPT

## 2017-03-27 PROCEDURE — 700105 HCHG RX REV CODE 258: Performed by: INTERNAL MEDICINE

## 2017-03-27 PROCEDURE — 80048 BASIC METABOLIC PNL TOTAL CA: CPT

## 2017-03-27 RX ORDER — POTASSIUM CHLORIDE 7.45 MG/ML
10 INJECTION INTRAVENOUS
Status: COMPLETED | OUTPATIENT
Start: 2017-03-27 | End: 2017-03-27

## 2017-03-27 RX ORDER — AMLODIPINE BESYLATE 5 MG/1
5 TABLET ORAL
Status: DISCONTINUED | OUTPATIENT
Start: 2017-03-27 | End: 2017-04-03 | Stop reason: HOSPADM

## 2017-03-27 RX ORDER — DEXTROSE MONOHYDRATE, SODIUM CHLORIDE, AND POTASSIUM CHLORIDE 50; 1.49; 9 G/1000ML; G/1000ML; G/1000ML
INJECTION, SOLUTION INTRAVENOUS CONTINUOUS
Status: DISCONTINUED | OUTPATIENT
Start: 2017-03-27 | End: 2017-03-27

## 2017-03-27 RX ADMIN — POTASSIUM CHLORIDE 10 MEQ: 7.46 INJECTION, SOLUTION INTRAVENOUS at 07:47

## 2017-03-27 RX ADMIN — AMPICILLIN SODIUM AND SULBACTAM SODIUM 3 G: 2; 1 INJECTION, POWDER, FOR SOLUTION INTRAMUSCULAR; INTRAVENOUS at 04:46

## 2017-03-27 RX ADMIN — THIAMINE HYDROCHLORIDE 500 MG: 100 INJECTION, SOLUTION INTRAMUSCULAR; INTRAVENOUS at 16:37

## 2017-03-27 RX ADMIN — AMPICILLIN SODIUM AND SULBACTAM SODIUM 3 G: 2; 1 INJECTION, POWDER, FOR SOLUTION INTRAMUSCULAR; INTRAVENOUS at 18:28

## 2017-03-27 RX ADMIN — STANDARDIZED SENNA CONCENTRATE AND DOCUSATE SODIUM 2 TABLET: 8.6; 5 TABLET, FILM COATED ORAL at 21:01

## 2017-03-27 RX ADMIN — POTASSIUM CHLORIDE, DEXTROSE MONOHYDRATE AND SODIUM CHLORIDE: 150; 5; 900 INJECTION, SOLUTION INTRAVENOUS at 14:25

## 2017-03-27 RX ADMIN — AMPICILLIN SODIUM AND SULBACTAM SODIUM 3 G: 2; 1 INJECTION, POWDER, FOR SOLUTION INTRAMUSCULAR; INTRAVENOUS at 23:56

## 2017-03-27 RX ADMIN — THIAMINE HYDROCHLORIDE 500 MG: 100 INJECTION, SOLUTION INTRAMUSCULAR; INTRAVENOUS at 21:01

## 2017-03-27 RX ADMIN — AMLODIPINE BESYLATE 5 MG: 5 TABLET ORAL at 18:28

## 2017-03-27 RX ADMIN — HEPARIN SODIUM 5000 UNITS: 5000 INJECTION INTRAVENOUS; SUBCUTANEOUS at 21:01

## 2017-03-27 RX ADMIN — POTASSIUM CHLORIDE 10 MEQ: 7.46 INJECTION, SOLUTION INTRAVENOUS at 09:00

## 2017-03-27 RX ADMIN — HEPARIN SODIUM 5000 UNITS: 5000 INJECTION INTRAVENOUS; SUBCUTANEOUS at 16:39

## 2017-03-27 RX ADMIN — AMPICILLIN SODIUM AND SULBACTAM SODIUM 3 G: 2; 1 INJECTION, POWDER, FOR SOLUTION INTRAMUSCULAR; INTRAVENOUS at 14:07

## 2017-03-27 RX ADMIN — POTASSIUM CHLORIDE 10 MEQ: 7.46 INJECTION, SOLUTION INTRAVENOUS at 10:12

## 2017-03-27 RX ADMIN — HEPARIN SODIUM 5000 UNITS: 5000 INJECTION INTRAVENOUS; SUBCUTANEOUS at 05:42

## 2017-03-27 RX ADMIN — THIAMINE HYDROCHLORIDE 500 MG: 100 INJECTION, SOLUTION INTRAMUSCULAR; INTRAVENOUS at 09:18

## 2017-03-27 ASSESSMENT — PAIN SCALES - GENERAL
PAINLEVEL_OUTOF10: 0
PAINLEVEL_OUTOF10: 0

## 2017-03-27 NOTE — CARE PLAN
Problem: Safety  Goal: Will remain free from injury  Outcome: PROGRESSING AS EXPECTED  Strip alarm and lap belt in place. Pt does not use call light. High fall risk    Problem: Skin Integrity  Goal: Risk for impaired skin integrity will decrease  Outcome: PROGRESSING AS EXPECTED  Incont of bowel and bladder. Frequent checks provided with care as needed

## 2017-03-27 NOTE — THERAPY
"Speech Language Therapy dysphagia treatment completed.   Functional Status:  Patient has been NPO for several days.  Upon entering the room, the patient presented with speech characterized as word salad. The patient alternated between periods of calmness and yelling.  She declined PO trials turning her head away shouting \"no!\"  However, when pudding, purees, soft solids, nectars and thin liquids were set up at bedside the patient took cup sips of nectars and thin liquids and tastes of pudding.  She demonstrated timely initiation of swallow trigger and had no overt s/sx of aspiration with any consistency consumed.  However, the patient did not consume adequate PO trials of pudding to make a safe judgment.  Sister present at beside at the conclusion of the session.  Discussed results and recs at length.  The patient's sister reported she did not believe the patient would want a tube feeding and stated she understood the patient may not meet her nutritional needs.    Recommendations: There are concerns that the patient will not meet her nutritional needs via PO diet but given level of agitation and confusion, she may not tolerate placement of a Cortrak and would be at an increased risk of aspirating tube feed should the Cortrak become dislodged.  Per EMR, family conference to be held today at 1400.  SLP will defer diet to MD at this time.  Should the patient/caregivers choose for the patient to eat despite risks, a full liquid diet is the safest diet to reduce, but not eliminate, aspiration risks.    Plan of Care: Will benefit from Speech Therapy 3 times per week  Post-Acute Therapy: Discharge to a transitional care facility for continued skilled therapy services.    See \"Rehab Therapy-Acute\" Patient Summary Report for complete documentation.     "

## 2017-03-27 NOTE — DISCHARGE PLANNING
Spoke with pts sister, Becky Fuentes, at length on pt and current medical needs. Becky is pts NOK and decision maker. Spoke with pts MD who would like to meet with the pts sister to determine plan of care. Spoke with Becky again who stated that she would like to come in today at 14:00 to meet with MD. MD updated.   DC plan undecided, pt may require skilled placement. Per sister pt has long term placement insurance but unsure of company name. MD will discuss plan later today with sister.   Becky Fuentes  Work [phone]  [place] [phone]  Cell [phone]

## 2017-03-27 NOTE — PROGRESS NOTES
Pt.'s significant other at bedside demanding to speak with someone regarding patients release. Explained to patient that we are still treating her with iv abx and that she is unable to pass her swallow eval and safety has become of concern in discharging her home. Pt family still very adamant in taking patient home. Pagetammi  and Dr. Harrell regarding his concerns. Social work will be up to speak with him

## 2017-03-27 NOTE — DOCUMENTATION QUERY
"DOCUMENTATION QUERY    PROVIDERS: Please select “Cosign w/ note”to reply to query.    To better represent the severity of illness of your patient, please review the following information and exercise your independent professional judgment in responding to this query.     \"Altered mental status secondary to possible sepsis UTI vs meningitis\" documented in H&P and \"Meningitis ruled out\" documented in Progress Notes. Based upon the clinical findings, risk factors, and treatment, can this diagnosis be further specified?     • Sepsis / UTI has resolved  • Sepsis / UTI has been ruled in  • Sepsis / UTI has been ruled out  • Other explanation of clinical findings  • Unable to determine    The medical record reflects the following:   Clinical Findings  \"Altered mental status secondary to possible sepsis UTI vs meningitis\" documented in H&P then \"Meningitis was ruled out\" in Progress Notes. WBC 16.1 T97 P63 R18 Urine Culture negative.   Treatment IV fluids, Rocephin   Risk Factors  AMS, Vascular Dementia w/ behavior disturbance, Poss Wenicke's Encephalopathy   Location within medical record  History and Physical and Progress Notes     Thank you,   Hilaria Latif RN  Clinical   [phone]      "

## 2017-03-27 NOTE — PROGRESS NOTES
[facility] [place]    Name Audrey Gauthier     1940   Age/Sex 77 y.o. female   MRN [id number]   Code Status DNAR     After 5PM or if no immediate response to page, please call for cross-coverage  Attending/Team: Dr Ramírez / kecia  Call [phone] to page   1st Call - Day Intern (R1):   SOTERO Patel 2nd Call - Day Sr. Resident (R2/R3):   Dr Marcano         Chief complaint/ reason for interval visit (Primary Diagnosis)   Altered mental status/ ?NPH vs worsening dementia    Interval Problem Daily Status Update    Overnight: BP was consistently high (got PRN labetolol). She was pulling off telemetry device.   Subjective: She was sleeping this morning.    - Tried to walk her yday. She walked few steps and said she does not want to continue as she wants to go home. She was stable on the feet, walked short steps. She wanted to hold on to things while walking.   - Got a call from nurse yday evening that patient's significant other was adamant to take her home. Nurse got help from  who explained to him that she cannot be discharged home as it is not safe. He told that he will work on gaudianship, call his  and enquire about few care facilities for her transfer.   - She did not get oral KCL yday. Togay K is 3. Ordered 30meq IV  - Hb 11.6, likely due to dilution  - Stopped oral medications as she is not taking orally. Vit B12, Vit D, seroquel, coenzyme 10, tylenol  - Stopped ASA and lipitor as there was no stroke on MRI.  - Unasyn day 4. Will stop tomorrow.  - Stopped NS and started D5NS with KCL. CTM 6pm BMP  - Started amlodipine 5mg daily.    - had a long talk with patients sister this evening. She is a respiratory nurse in [place]. Discusses about the goals of care, code status, disposition. Regarding goals of care, she wants her sister to have quality of life. She did not want cortrak or PEG tube for feeding. She said DNAR. She wants pt to be transferred to SNF  and she does not want the patient to be transferred back to hospital for treatment. She was asked to talk to patient's boy friend about today's talk.      Review of Systems   Unable to perform ROS: medical condition       Consultants/Specialty  None    Disposition  Inpatient for AMS evaluation and management    Quality Measures  Labs reviewed, Medications reviewed, EKG reviewed and Radiology images reviewed  Salcedo catheter: No Salcedo      DVT Prophylaxis: Heparin    Ulcer prophylaxis: Not indicated  : yes.            Physical Exam       Filed Vitals:    03/26/17 1600 03/26/17 2000 03/27/17 0000 03/27/17 0400   BP: 179/88 207/88 154/77 127/88   Pulse: 75 74 60 76   Temp: 36.7 °C (98.1 °F) 36.5 °C (97.7 °F) 36.1 °C (97 °F) 36.4 °C (97.6 °F)   TempSrc:       Resp: 19 18 18 17   Height:       Weight:       SpO2: 92% 96% 96% 90%     Body mass index is 19.67 kg/(m^2).    Oxygen Therapy:  Pulse Oximetry: 90 %, O2 (LPM): 0, O2 Delivery: None (Room Air)    Physical Exam   Constitutional:   Patient is altered.   HENT:   Head: Normocephalic and atraumatic.   Eyes: EOM are normal.   Pupils are less reactive and mildly dilated.    Neck: Normal range of motion. Neck supple.   She resists movement of the neck   Cardiovascular: Normal rate and regular rhythm.  Exam reveals no gallop and no friction rub.    No murmur heard.  Pulmonary/Chest: Effort normal and breath sounds normal. She has no wheezes. She exhibits no tenderness.   Abdominal: Soft. Bowel sounds are normal. She exhibits no distension. There is no tenderness. There is no rebound.   Musculoskeletal: Normal range of motion.   Neurological:   AAO x O  Speech: Fluent but word salad  Strength: unable to assess but patient moving 4 extremities  Reflexes: 2+ in knee, ankle, brachialis and brachioradialis.  Babinski: withdrawal  She was stable on feet. Walked few short steps.    Skin: Skin is warm.         Lab Data Review:      3/24/2017  1:17 PM    Recent Labs      03/25/17    0328 03/26/17   0419  03/27/17   0403   SODIUM  138  138  139   POTASSIUM  3.9  3.6  3.0*   CHLORIDE  111  107  106   CO2  17*  21  20   BUN  13  10  9   CREATININE  0.74  0.74  0.78   MAGNESIUM   --   2.1   --    CALCIUM  8.8  9.1  9.1       Recent Labs      03/25/17 0328 03/26/17   0419  03/27/17   0403   ALTSGPT  13  18   --    ASTSGOT  23  19   --    ALKPHOSPHAT  71  69   --    TBILIRUBIN  0.6  0.6   --    GLUCOSE  93  83  81       Recent Labs      03/25/17 0328 03/26/17   0419  03/27/17   0558   RBC  4.09*  4.25  4.08*   HEMOGLOBIN  11.8*  12.2  11.6*   HEMATOCRIT  36.5*  35.8*  34.1*   PLATELETCT  170  183  195       Recent Labs      03/25/17 0328 03/26/17 0419  03/27/17   0558   WBC  12.0*  10.9*  9.6   NEUTSPOLYS  72.90*  62.40  55.20   LYMPHOCYTES  18.50*  26.70  29.20   MONOCYTES  7.70  7.80  12.10   EOSINOPHILS  0.20  2.10  2.50   BASOPHILS  0.30  0.60  0.60   ASTSGOT  23  19   --    ALTSGPT  13  18   --    ALKPHOSPHAT  71  69   --    TBILIRUBIN  0.6  0.6   --            Assessment/Plan     Altered mental status  Assessment & Plan  - IMPROVED  - Pt was admitted for acute onset of AMS. He baseline is: demented but was not confused, she walked around  - On exam: word salad and normal motor exam  - WBC16 with left shift. UA wnl. Normal B12 and TSH. borderline Vit D. UDS positive for benzos.   - CT scan:  Bilateral ventricular dilatation, appears increased since prior study.  - CXR, CT abdomen wnl.   - RPR neg  - LP (3/24): CSF: OP: [id number], Glucose 75, TP 41, no organisms. HSV neg  - MRI shows signs suggestive of NPH and significant atrophy.   - Got vanco, C3, ampicillin and decadron on admission.   DDx: Encephalitis, stroke, meningitis is ruled out. NPH vs wernicke's encephalopathy vs worsening dementia.   Plan  - Fall, seizures and aspiration precautions, Q4 neurochecks.   - Stopped Aspirin and atorvastatin  - PT/OT/SLP  - Will consult neurology if necessary  - Thiamine 500mg TID for possible  wernicke's encephalopathy  - Changed ceftriaxone to unasyn for possible aspiration PNA.     Vascular dementia with behavior disturbance  Assessment & Plan  - has h/o dementia ( alziemer's and/or vascular).  - H/o agitation in the past  - Held memantine and donepezil for now.     Leucocytosis  Assessment & Plan  - resolved  - leucocytosis with left shift  - UA, CXR, abd CT wnl.   - ?Aspiration  Plan  - CTM  - Unasyn (stop 3/28)    Hypertension  Assessment & Plan  Started 5mg amlodipine and cont PRN labetolol    Hypokalemia  Assessment & Plan  k 3  - Ordered IV KCL    Hyperlipidemia  Assessment & Plan  - Initially simvastatin was changed to lipitor but held as patient is not taking anything orally.     B12 deficiency  Assessment & Plan  - Normal B12  - held B12 as patient is not taking anything orally    Vitamin D deficiency  Assessment & Plan  - Vit D 26  - held Vit D as patient is not taking anything orally    CKD (chronic kidney disease) stage 3, GFR 30-59 ml/min  Assessment & Plan  - Stable  - CTM    Overactive bladder  Assessment & Plan  - held tolterodine for now

## 2017-03-27 NOTE — CARE PLAN
Problem: Safety  Goal: Will remain free from injury  Outcome: PROGRESSING AS EXPECTED    Problem: Skin Integrity  Goal: Risk for impaired skin integrity will decrease  Outcome: PROGRESSING AS EXPECTED

## 2017-03-27 NOTE — DISCHARGE PLANNING
Pts sister, Becky is here to meet with Dr. Yeison TRAN that Becky is her, MD currently involved in new admit, yeison Pena that it will be approx 14:40 before Md can arrive. Becky was very understanding and will await the MD at the bedside.

## 2017-03-27 NOTE — PROGRESS NOTES
20:45 Spoke with on call, Dr. Castaneda, and reported patient's current VSs and persistent restlessness which includes pulling off her telemetry monitor. Requested review to discontinue telemetry monitor.

## 2017-03-27 NOTE — PROGRESS NOTES
Monitor Summary: SB 58-SR 79, AR 0.16, QRS 0.08, QT 0.44 with HR to 47 per strip from monitor room.

## 2017-03-28 LAB
ANION GAP SERPL CALC-SCNC: 10 MMOL/L (ref 0–11.9)
ANION GAP SERPL CALC-SCNC: 8 MMOL/L (ref 0–11.9)
BACTERIA BLD CULT: NORMAL
BACTERIA BLD CULT: NORMAL
BUN SERPL-MCNC: 4 MG/DL (ref 8–22)
BUN SERPL-MCNC: 5 MG/DL (ref 8–22)
CALCIUM SERPL-MCNC: 9.1 MG/DL (ref 8.5–10.5)
CALCIUM SERPL-MCNC: 9.4 MG/DL (ref 8.5–10.5)
CHLORIDE SERPL-SCNC: 107 MMOL/L (ref 96–112)
CHLORIDE SERPL-SCNC: 107 MMOL/L (ref 96–112)
CO2 SERPL-SCNC: 21 MMOL/L (ref 20–33)
CO2 SERPL-SCNC: 23 MMOL/L (ref 20–33)
CREAT SERPL-MCNC: 0.78 MG/DL (ref 0.5–1.4)
CREAT SERPL-MCNC: 0.86 MG/DL (ref 0.5–1.4)
GFR SERPL CREATININE-BSD FRML MDRD: >60 ML/MIN/1.73 M 2
GFR SERPL CREATININE-BSD FRML MDRD: >60 ML/MIN/1.73 M 2
GLUCOSE SERPL-MCNC: 105 MG/DL (ref 65–99)
GLUCOSE SERPL-MCNC: 115 MG/DL (ref 65–99)
POTASSIUM SERPL-SCNC: 3.1 MMOL/L (ref 3.6–5.5)
POTASSIUM SERPL-SCNC: 3.8 MMOL/L (ref 3.6–5.5)
SIGNIFICANT IND 70042: NORMAL
SIGNIFICANT IND 70042: NORMAL
SITE SITE: NORMAL
SITE SITE: NORMAL
SODIUM SERPL-SCNC: 138 MMOL/L (ref 135–145)
SODIUM SERPL-SCNC: 138 MMOL/L (ref 135–145)
SOURCE SOURCE: NORMAL
SOURCE SOURCE: NORMAL

## 2017-03-28 PROCEDURE — 700105 HCHG RX REV CODE 258: Performed by: INTERNAL MEDICINE

## 2017-03-28 PROCEDURE — 700111 HCHG RX REV CODE 636 W/ 250 OVERRIDE (IP): Performed by: INTERNAL MEDICINE

## 2017-03-28 PROCEDURE — 99231 SBSQ HOSP IP/OBS SF/LOW 25: CPT | Mod: GC | Performed by: INTERNAL MEDICINE

## 2017-03-28 PROCEDURE — A9270 NON-COVERED ITEM OR SERVICE: HCPCS | Performed by: INTERNAL MEDICINE

## 2017-03-28 PROCEDURE — 36415 COLL VENOUS BLD VENIPUNCTURE: CPT

## 2017-03-28 PROCEDURE — 770006 HCHG ROOM/CARE - MED/SURG/GYN SEMI*

## 2017-03-28 PROCEDURE — 700102 HCHG RX REV CODE 250 W/ 637 OVERRIDE(OP): Performed by: INTERNAL MEDICINE

## 2017-03-28 PROCEDURE — 80048 BASIC METABOLIC PNL TOTAL CA: CPT

## 2017-03-28 RX ORDER — HYDRALAZINE HYDROCHLORIDE 20 MG/ML
10 INJECTION INTRAMUSCULAR; INTRAVENOUS EVERY 6 HOURS PRN
Status: DISCONTINUED | OUTPATIENT
Start: 2017-03-28 | End: 2017-04-03 | Stop reason: HOSPADM

## 2017-03-28 RX ORDER — POTASSIUM CHLORIDE 7.45 MG/ML
10 INJECTION INTRAVENOUS
Status: COMPLETED | OUTPATIENT
Start: 2017-03-28 | End: 2017-03-28

## 2017-03-28 RX ADMIN — POTASSIUM CHLORIDE 10 MEQ: 7.46 INJECTION, SOLUTION INTRAVENOUS at 09:22

## 2017-03-28 RX ADMIN — POTASSIUM CHLORIDE 10 MEQ: 7.46 INJECTION, SOLUTION INTRAVENOUS at 10:10

## 2017-03-28 RX ADMIN — AMPICILLIN SODIUM AND SULBACTAM SODIUM 3 G: 2; 1 INJECTION, POWDER, FOR SOLUTION INTRAMUSCULAR; INTRAVENOUS at 12:39

## 2017-03-28 RX ADMIN — POTASSIUM CHLORIDE 10 MEQ: 7.46 INJECTION, SOLUTION INTRAVENOUS at 08:16

## 2017-03-28 RX ADMIN — THIAMINE HYDROCHLORIDE 500 MG: 100 INJECTION, SOLUTION INTRAMUSCULAR; INTRAVENOUS at 05:51

## 2017-03-28 RX ADMIN — LABETALOL HYDROCHLORIDE 10 MG: 5 INJECTION, SOLUTION INTRAVENOUS at 11:08

## 2017-03-28 RX ADMIN — HEPARIN SODIUM 5000 UNITS: 5000 INJECTION INTRAVENOUS; SUBCUTANEOUS at 13:58

## 2017-03-28 RX ADMIN — LABETALOL HYDROCHLORIDE 10 MG: 5 INJECTION, SOLUTION INTRAVENOUS at 05:54

## 2017-03-28 RX ADMIN — HEPARIN SODIUM 5000 UNITS: 5000 INJECTION INTRAVENOUS; SUBCUTANEOUS at 21:19

## 2017-03-28 RX ADMIN — HEPARIN SODIUM 5000 UNITS: 5000 INJECTION INTRAVENOUS; SUBCUTANEOUS at 05:52

## 2017-03-28 RX ADMIN — AMPICILLIN SODIUM AND SULBACTAM SODIUM 3 G: 2; 1 INJECTION, POWDER, FOR SOLUTION INTRAMUSCULAR; INTRAVENOUS at 04:58

## 2017-03-28 ASSESSMENT — PAIN SCALES - GENERAL
PAINLEVEL_OUTOF10: 0
PAINLEVEL_OUTOF10: 0

## 2017-03-28 ASSESSMENT — LIFESTYLE VARIABLES: DO YOU DRINK ALCOHOL: NO

## 2017-03-28 NOTE — ASSESSMENT & PLAN NOTE
Has elevated BP. She was on permissive hypertension initially till stroke was ruled out. Started on PRN antihypertensives and 5mg amlodipine later. There was a good response in first 2 days when she received it but apparently it has been challenging for nurses to administer it and she refuses multiple times.  - Will continue with PO amlodipine for now and make an effort to administer PO meds  - Will keep PRN IV hydralazine if BP is >180/100

## 2017-03-28 NOTE — PROGRESS NOTES
Patient received, report taken at bedside, appears resting comfortably in bed, no indications of pain or discomfort at this time, bed alarm and lap belt on for patient safety, close to nurses station, turn q2h, frequent room checks and will continue to monitor

## 2017-03-28 NOTE — PROGRESS NOTES
Pt is orient to self. Expressive aphasia with word salad noted. Pt was made DNR today and was evaluated by speech therapy. Diet was ordered. Lap belt and strip alarm in place. Pt able to release belt her self. Pt incontinent of bowel and bladder with large frequent urination. PIV right FA D5NS +20meq KCL @ 83, intermittent abx therapy. Tele: SR.  Takes oral meds crushed in apple sauce without difficulty.

## 2017-03-28 NOTE — PROGRESS NOTES
Goals of Care Discussion with Patient's Sister:    Dr. Harrell and I met with Mrs. Gauthier sister today, she is her next of kin and primary decision maker at this time since she is not legally  to her boyfriend.   The sister thought that the pt is now at her baseline for her dementia based on her most recent contact with her. She agreed to be involved in her care and willing to make decisions for her. She is also pursuing a legal guardianship from court at this time.  Understanding that she has advanced dementia and the natural course of this disease will continue to deteriorate she recommended focusing on quality of life at this point.  - Elected the pt to be DNR  - Feeding: okay oral feeding. Will start on full liquid diet per speech recs given no evidence of aspiration with this type of food on evaluation. Recommended against tube feeding.  - Okay for SNF placement. Recommended against home discharge with boyfriend since she feels it's not a safe home environment  - Recommended against re-hospitalization   - Don't treat complications like acute infections eg; UTI or aspiration pneumonia and allow nature to take it's course. She thought we will be prolonging her suffering if we kept treating acute complications of her dementia course.    We will update orders to reflect the above recommendations.  Will refer to SNF.

## 2017-03-28 NOTE — PROGRESS NOTES
[facility] – [place] [facility] Medicine Interval Note    Name Audrey Gauthier     1940   Age/Sex 77 y.o. female   MRN [id number]   Code Status DNAR     After 5PM or if no immediate response to page, please call for cross-coverage  Attending/Team: Dr Ramírez / kecia  Call [phone] to page   1st Call - Day Intern (R1):   SOTERO Patel 2nd Call - Day Sr. Resident (R2/R3):   Dr Marcano         Chief complaint/ reason for interval visit (Primary Diagnosis)   Altered mental status/ ?NPH vs worsening dementia    Interval Problem Daily Status Update    Overnight: BP is stable  Subjective: She was sleeping this morning.    - SW is working on her Ctrip   - K 3.1, repeat BMP at 2pm, will CTM  - S/p thiamine 500mg for 2 days. Started 250 mg daily x 5days from tomorrow.        Review of Systems   Unable to perform ROS: medical condition       Consultants/Specialty  None    Disposition  Inpatient for AMS evaluation and management    Quality Measures  Labs reviewed, Medications reviewed, EKG reviewed and Radiology images reviewed  Salcedo catheter: No Salcedo      DVT Prophylaxis: Heparin    Ulcer prophylaxis: Not indicated  : yes.            Physical Exam       Filed Vitals:    17 0400 17 0959 17 1100 17 1200   BP: 181/66 177/93 187/95 187/95   Pulse: 66 66  68   Temp: 36.5 °C (97.7 °F) 36.4 °C (97.6 °F)  37.3 °C (99.2 °F)   TempSrc:       Resp: 18 16  18   Height:       Weight:       SpO2: 96% 97%  96%     Body mass index is 19.67 kg/(m^2).    Oxygen Therapy:  Pulse Oximetry: 96 %, O2 (LPM): 0, O2 Delivery: None (Room Air)    Physical Exam   Constitutional:   Patient is altered.   HENT:   Head: Normocephalic and atraumatic.   Eyes: EOM are normal.   Pupils are less reactive and mildly dilated.    Neck: Normal range of motion. Neck supple.   She resists movement of the neck   Cardiovascular: Normal rate and regular rhythm.  Exam reveals no gallop and no friction rub.    No murmur  heard.  Pulmonary/Chest: Effort normal and breath sounds normal. She has no wheezes. She exhibits no tenderness.   Abdominal: Soft. Bowel sounds are normal. She exhibits no distension. There is no tenderness. There is no rebound.   Musculoskeletal: Normal range of motion.   Neurological:   AAO x O  Speech: Fluent but word salad  Strength: unable to assess but patient moving 4 extremities  Reflexes: 2+ in knee, ankle, brachialis and brachioradialis.  Babinski: withdrawal     Skin: Skin is warm.         Lab Data Review:      3/24/2017  1:17 PM    Recent Labs      03/26/17 0419 03/27/17 0403  03/27/17   1351  03/28/17   0231   SODIUM  138  139  137  138   POTASSIUM  3.6  3.0*  3.5*  3.1*   CHLORIDE  107  106  105  107   CO2  21  20  20  21   BUN  10  9  8  4*   CREATININE  0.74  0.78  0.78  0.78   MAGNESIUM  2.1   --    --    --    CALCIUM  9.1  9.1  9.7  9.1       Recent Labs      03/26/17 0419 03/27/17 0403  03/27/17   1351  03/28/17   0231   ALTSGPT  18   --    --    --    ASTSGOT  19   --    --    --    ALKPHOSPHAT  69   --    --    --    TBILIRUBIN  0.6   --    --    --    GLUCOSE  83  81  113*  105*       Recent Labs      03/26/17 0419 03/27/17   0558   RBC  4.25  4.08*   HEMOGLOBIN  12.2  11.6*   HEMATOCRIT  35.8*  34.1*   PLATELETCT  183  195       Recent Labs      03/26/17 0419 03/27/17   0558   WBC  10.9*  9.6   NEUTSPOLYS  62.40  55.20   LYMPHOCYTES  26.70  29.20   MONOCYTES  7.80  12.10   EOSINOPHILS  2.10  2.50   BASOPHILS  0.60  0.60   ASTSGOT  19   --    ALTSGPT  18   --    ALKPHOSPHAT  69   --    TBILIRUBIN  0.6   --            Assessment/Plan     Altered mental status  Assessment & Plan  - IMPROVED  - Pt was admitted for acute onset of AMS. He baseline is: demented but was not confused, she walked around  - On exam: word salad and normal motor exam  - WBC16 with left shift. UA wnl. Normal B12 and TSH. borderline Vit D. UDS positive for benzos.   - CT scan:  Bilateral ventricular  dilatation, appears increased since prior study.  - CXR, CT abdomen wnl.   - RPR neg  - LP (3/24): CSF: OP: [id number], Glucose 75, TP 41, no organisms. HSV neg  - MRI shows signs suggestive of NPH and significant atrophy.   - Got vanco, C3, ampicillin and decadron on admission.   DDx: Encephalitis, stroke, meningitis is ruled out. NPH vs wernicke's encephalopathy vs worsening dementia.   Plan  - Fall, seizures and aspiration precautions, Q4 neurochecks.   - Stopped Aspirin and atorvastatin  - PT/OT/SLP  - Will consult neurology if necessary  - s/p Thiamine 500mg TID x 2d for possible wernicke's encephalopathy. Started 250mg daily for 5 more days  - s/p unasyn for 5days    Vascular dementia with behavior disturbance  Assessment & Plan  - has h/o dementia ( alziemer's and/or vascular).  - H/o agitation in the past  - Held memantine and donepezil for now.     Leucocytosis  Assessment & Plan  - resolved  - leucocytosis with left shift  - UA, CXR, abd CT wnl.   - ?Aspiration  Plan  - CTM  - Unasyn (stop 3/28)    Hypertension  Assessment & Plan  Started 5mg amlodipine and cont PRN labetolol    Hypokalemia  Assessment & Plan  - K 3.1  - Cont IV KCL with D5NS    Hyperlipidemia  Assessment & Plan  - Initially simvastatin was changed to lipitor but held as patient is not taking anything orally.     B12 deficiency  Assessment & Plan  - Normal B12  - held B12 as patient is not taking anything orally    Vitamin D deficiency  Assessment & Plan  - Vit D 26  - held Vit D as patient is not taking anything orally    CKD (chronic kidney disease) stage 3, GFR 30-59 ml/min  Assessment & Plan  - Stable  - CTM    Overactive bladder  Assessment & Plan  - held tolterodine for now

## 2017-03-29 LAB
ANION GAP SERPL CALC-SCNC: 7 MMOL/L (ref 0–11.9)
BUN SERPL-MCNC: 7 MG/DL (ref 8–22)
CALCIUM SERPL-MCNC: 9.4 MG/DL (ref 8.5–10.5)
CHLORIDE SERPL-SCNC: 107 MMOL/L (ref 96–112)
CO2 SERPL-SCNC: 24 MMOL/L (ref 20–33)
CREAT SERPL-MCNC: 0.94 MG/DL (ref 0.5–1.4)
GFR SERPL CREATININE-BSD FRML MDRD: 58 ML/MIN/1.73 M 2
GLUCOSE SERPL-MCNC: 118 MG/DL (ref 65–99)
POTASSIUM SERPL-SCNC: 3.6 MMOL/L (ref 3.6–5.5)
SODIUM SERPL-SCNC: 138 MMOL/L (ref 135–145)

## 2017-03-29 PROCEDURE — 700102 HCHG RX REV CODE 250 W/ 637 OVERRIDE(OP): Performed by: INTERNAL MEDICINE

## 2017-03-29 PROCEDURE — 99231 SBSQ HOSP IP/OBS SF/LOW 25: CPT | Mod: GC | Performed by: INTERNAL MEDICINE

## 2017-03-29 PROCEDURE — 700111 HCHG RX REV CODE 636 W/ 250 OVERRIDE (IP): Performed by: INTERNAL MEDICINE

## 2017-03-29 PROCEDURE — 36415 COLL VENOUS BLD VENIPUNCTURE: CPT

## 2017-03-29 PROCEDURE — A9270 NON-COVERED ITEM OR SERVICE: HCPCS | Performed by: INTERNAL MEDICINE

## 2017-03-29 PROCEDURE — 770006 HCHG ROOM/CARE - MED/SURG/GYN SEMI*

## 2017-03-29 PROCEDURE — 80048 BASIC METABOLIC PNL TOTAL CA: CPT

## 2017-03-29 RX ORDER — THIAMINE MONONITRATE (VIT B1) 100 MG
100 TABLET ORAL DAILY
Status: DISCONTINUED | OUTPATIENT
Start: 2017-03-30 | End: 2017-04-03 | Stop reason: HOSPADM

## 2017-03-29 RX ADMIN — THIAMINE HYDROCHLORIDE 250 MG: 100 INJECTION, SOLUTION INTRAMUSCULAR; INTRAVENOUS at 09:00

## 2017-03-29 RX ADMIN — HEPARIN SODIUM 5000 UNITS: 5000 INJECTION INTRAVENOUS; SUBCUTANEOUS at 05:04

## 2017-03-29 RX ADMIN — HEPARIN SODIUM 5000 UNITS: 5000 INJECTION INTRAVENOUS; SUBCUTANEOUS at 20:09

## 2017-03-29 RX ADMIN — AMLODIPINE BESYLATE 5 MG: 5 TABLET ORAL at 08:31

## 2017-03-29 RX ADMIN — HEPARIN SODIUM 5000 UNITS: 5000 INJECTION INTRAVENOUS; SUBCUTANEOUS at 15:24

## 2017-03-29 ASSESSMENT — PAIN SCALES - GENERAL
PAINLEVEL_OUTOF10: 0
PAINLEVEL_OUTOF10: 0

## 2017-03-29 ASSESSMENT — PATIENT HEALTH QUESTIONNAIRE - PHQ9
SUM OF ALL RESPONSES TO PHQ9 QUESTIONS 1 AND 2: 0
2. FEELING DOWN, DEPRESSED, IRRITABLE, OR HOPELESS: NOT AT ALL
SUM OF ALL RESPONSES TO PHQ QUESTIONS 1-9: 0
1. LITTLE INTEREST OR PLEASURE IN DOING THINGS: NOT AT ALL

## 2017-03-29 NOTE — CARE PLAN
Problem: Safety  Goal: Will remain free from injury  Outcome: PROGRESSING AS EXPECTED  Bed in lowest position and call light within reach.  Frequent rounding implemented.    Problem: Venous Thromboembolism (VTW)/Deep Vein Thrombosis (DVT) Prevention:  Goal: Patient will participate in Venous Thrombosis (VTE)/Deep Vein Thrombosis (DVT)Prevention Measures  Outcome: PROGRESSING AS EXPECTED  Heparin Q 8 hour therapy maintained.    Problem: Pain Management  Goal: Pain level will decrease to patient’s comfort goal  Outcome: PROGRESSING AS EXPECTED  Denies pain.

## 2017-03-29 NOTE — DISCHARGE PLANNING
Transitional Care Services:    TCN spoke to patient's sister Becky to discuss transitional care services for discharge planning.  SNF level of care discussed.  Sister is in agreement with SNF.  Sister is currently working on getting guardianship.  Pt does have a long-term care benefit, but Becky is not sure of the details.  Becky is not sure if the patient is safe returning home with significant other at this time.  Paid in-home care, long-term SNF care, and memory care ADWOA's discussed.  PT/OT pending to determine skilled need.  Sisters preference is [facility] as it is near patient's home, but she is open to suggestions from patient's significant other.   Sw aware.  TCN will continue to follow for PT/OT recommendations.

## 2017-03-29 NOTE — PROGRESS NOTES
[facility] – [place] [place] Internal Medicine Interval Note    Name Audrey Gauthier     1940   Age/Sex 77 y.o. female   MRN [id number]   Code Status DNAR     After 5PM or if no immediate response to page, please call for cross-coverage  Attending/Team: Dr Ramírez / kecia  Call [phone] to page   1st Call - Day Intern (R1):   SOTERO Patel 2nd Call - Day Sr. Resident (R2/R3):   Dr Marcano         Chief complaint/ reason for interval visit (Primary Diagnosis)   Altered mental status/ ?NPH vs worsening dementia    Interval Problem Daily Status Update    Overnight: No acute events  Subjective: Pt was alert and responsive this morning. Answering questions but she is not oriented even to herself. No new complains.    - Dementia: apparently now at baseline. Discussed with boyfriend at bedside yesterday. Pt will be going to SNF. Pending PT/OT evaluation.  - Dysphagia: Cleared for full diet. Doing better with PO intake. No IVFs.  - K noted to be stable  - BP: On the higher site overnight. Refused her amlodipine dose yesterday. Continue to encourage po meds. PRN IV if needed.  - SW to follow for SNF referral    Review of Systems   Unable to perform ROS: medical condition     Consultants/Specialty  None    Disposition  Inpatient for AMS evaluation and management    Quality Measures  Labs reviewed, Medications reviewed, EKG reviewed and Radiology images reviewed  Salcedo catheter: No Salcedo      DVT Prophylaxis: Heparin    Ulcer prophylaxis: Not indicated  : yes.          Physical Exam       Filed Vitals:    17 1600 17 2000 17 0400 17 0800   BP: 145/74 133/75 164/89 190/79   Pulse: 63 78 63 68   Temp: 36.6 °C (97.8 °F) 36.6 °C (97.9 °F) 36.3 °C (97.4 °F) 36.3 °C (97.4 °F)   TempSrc:       Resp: 16 16 16 16   Height:       Weight:       SpO2: 94% 97% 95% 96%     Body mass index is 19.67 kg/(m^2).    Oxygen Therapy:  Pulse Oximetry: 96 %, O2 (LPM): 0, O2 Delivery: None (Room Air)    Physical Exam    Constitutional: No distress.   Patient is alert but not oriented. Very calm and tries to answer questions but not meaningful.    HENT:   Head: Normocephalic and atraumatic.   Eyes: Conjunctivae and EOM are normal. Right eye exhibits no discharge. No scleral icterus.   Neck: Neck supple.   She resists movement of the neck   Cardiovascular: Normal rate, regular rhythm and normal heart sounds.  Exam reveals no gallop and no friction rub.    No murmur heard.  Pulmonary/Chest: Effort normal and breath sounds normal. She has no wheezes. She exhibits no tenderness.   Abdominal: Soft. Bowel sounds are normal. She exhibits no distension. There is no tenderness. There is no rebound.   Musculoskeletal: She exhibits no edema or tenderness.   Neurological:   Speech: Fluent but word salad  Strength: unable to assess but patient moving 4 extremities       Skin: Skin is warm. She is not diaphoretic.         Lab Data Review:      3/24/2017  1:17 PM    Recent Labs      03/28/17   0231  03/28/17   1538  03/29/17   0310   SODIUM  138  138  138   POTASSIUM  3.1*  3.8  3.6   CHLORIDE  107  107  107   CO2  21  23  24   BUN  4*  5*  7*   CREATININE  0.78  0.86  0.94   CALCIUM  9.1  9.4  9.4       Recent Labs      03/28/17   0231  03/28/17   1538  03/29/17   0310   GLUCOSE  105*  115*  118*       Recent Labs      03/27/17   0558   RBC  4.08*   HEMOGLOBIN  11.6*   HEMATOCRIT  34.1*   PLATELETCT  195       Recent Labs      03/27/17   0558   WBC  9.6   NEUTSPOLYS  55.20   LYMPHOCYTES  29.20   MONOCYTES  12.10   EOSINOPHILS  2.50   BASOPHILS  0.60           Assessment/Plan     Altered mental status  Assessment & Plan  - IMPROVED  - Pt was admitted for acute onset of AMS. He baseline is: demented but was not confused, she walked around  - On exam: word salad and normal motor exam  - WBC16 with left shift. UA wnl. Normal B12 and TSH. borderline Vit D. UDS positive for benzos.   - CT scan:  Bilateral ventricular dilatation, appears increased  since prior study.  - CXR, CT abdomen wnl.   - RPR neg  - LP (3/24): CSF: OP: [id number], Glucose 75, TP 41, no organisms. HSV neg  - MRI shows signs suggestive of NPH and significant atrophy.   - Got vanco, C3, ampicillin and decadron on admission.   DDx: Encephalitis, stroke, meningitis is ruled out. NPH vs wernicke's encephalopathy vs worsening dementia.   Plan  - Fall, seizures and aspiration precautions, Q4 neurochecks.   - Stopped Aspirin and atorvastatin  - PT/OT/SLP  - Will consult neurology if necessary  - s/p Thiamine 500mg TID x 2d for possible wernicke's encephalopathy. Started 250mg daily for 5 more days  - s/p unasyn for 5days    Vascular dementia with behavior disturbance  Assessment & Plan  - has h/o dementia ( alziemer's and/or vascular).  - H/o agitation in the past  - Held memantine and donepezil for now.     Leucocytosis  Assessment & Plan  - resolved  - leucocytosis with left shift  - UA, CXR, abd CT wnl.   - ?Aspiration  Plan  - CTM  - Unasyn (stop 3/28)    Hypertension  Assessment & Plan  Started 5mg amlodipine and cont PRN labetolol    Hypokalemia  Assessment & Plan  - K 3.1  - Cont IV KCL with D5NS    Hyperlipidemia  Assessment & Plan  - Initially simvastatin was changed to lipitor but held as patient is not taking anything orally.     B12 deficiency  Assessment & Plan  - Normal B12  - held B12 as patient is not taking anything orally    Vitamin D deficiency  Assessment & Plan  - Vit D 26  - held Vit D as patient is not taking anything orally    CKD (chronic kidney disease) stage 3, GFR 30-59 ml/min  Assessment & Plan  - Stable  - CTM    Overactive bladder  Assessment & Plan  - held tolterodine for now

## 2017-03-29 NOTE — CARE PLAN
Problem: Communication  Goal: The ability to communicate needs accurately and effectively will improve  Outcome: PROGRESSING SLOWER THAN EXPECTED  Expressive aphasia makes communication very difficult    Problem: Safety  Goal: Will remain free from injury  Outcome: PROGRESSING AS EXPECTED  Strip alarm and self release lap belt in place

## 2017-03-30 PROCEDURE — 99231 SBSQ HOSP IP/OBS SF/LOW 25: CPT | Mod: GC | Performed by: INTERNAL MEDICINE

## 2017-03-30 PROCEDURE — 700102 HCHG RX REV CODE 250 W/ 637 OVERRIDE(OP): Performed by: INTERNAL MEDICINE

## 2017-03-30 PROCEDURE — A9270 NON-COVERED ITEM OR SERVICE: HCPCS | Performed by: INTERNAL MEDICINE

## 2017-03-30 PROCEDURE — G8987 SELF CARE CURRENT STATUS: HCPCS | Mod: CM

## 2017-03-30 PROCEDURE — 700111 HCHG RX REV CODE 636 W/ 250 OVERRIDE (IP): Performed by: INTERNAL MEDICINE

## 2017-03-30 PROCEDURE — 770006 HCHG ROOM/CARE - MED/SURG/GYN SEMI*

## 2017-03-30 PROCEDURE — G8988 SELF CARE GOAL STATUS: HCPCS | Mod: CK

## 2017-03-30 PROCEDURE — 700105 HCHG RX REV CODE 258: Performed by: INTERNAL MEDICINE

## 2017-03-30 PROCEDURE — 97166 OT EVAL MOD COMPLEX 45 MIN: CPT

## 2017-03-30 PROCEDURE — 97162 PT EVAL MOD COMPLEX 30 MIN: CPT

## 2017-03-30 PROCEDURE — G8978 MOBILITY CURRENT STATUS: HCPCS | Mod: CL

## 2017-03-30 PROCEDURE — G8979 MOBILITY GOAL STATUS: HCPCS | Mod: CJ

## 2017-03-30 RX ORDER — DEXTROSE AND SODIUM CHLORIDE 5; .45 G/100ML; G/100ML
INJECTION, SOLUTION INTRAVENOUS CONTINUOUS
Status: DISCONTINUED | OUTPATIENT
Start: 2017-03-30 | End: 2017-03-31

## 2017-03-30 RX ADMIN — HEPARIN SODIUM 5000 UNITS: 5000 INJECTION INTRAVENOUS; SUBCUTANEOUS at 05:34

## 2017-03-30 RX ADMIN — Medication 100 MG: at 09:03

## 2017-03-30 RX ADMIN — AMLODIPINE BESYLATE 5 MG: 5 TABLET ORAL at 09:03

## 2017-03-30 RX ADMIN — HEPARIN SODIUM 5000 UNITS: 5000 INJECTION INTRAVENOUS; SUBCUTANEOUS at 20:35

## 2017-03-30 RX ADMIN — HEPARIN SODIUM 5000 UNITS: 5000 INJECTION INTRAVENOUS; SUBCUTANEOUS at 14:38

## 2017-03-30 RX ADMIN — STANDARDIZED SENNA CONCENTRATE AND DOCUSATE SODIUM 2 TABLET: 8.6; 5 TABLET, FILM COATED ORAL at 09:03

## 2017-03-30 RX ADMIN — DEXTROSE MONOHYDRATE AND SODIUM CHLORIDE: 5; .45 INJECTION, SOLUTION INTRAVENOUS at 15:45

## 2017-03-30 ASSESSMENT — ACTIVITIES OF DAILY LIVING (ADL): TOILETING: UNABLE TO DETERMINE AT THIS TIME

## 2017-03-30 ASSESSMENT — PAIN SCALES - GENERAL
PAINLEVEL_OUTOF10: 0
PAINLEVEL_OUTOF10: 0

## 2017-03-30 ASSESSMENT — GAIT ASSESSMENTS: GAIT LEVEL OF ASSIST: UNABLE TO PARTICIPATE

## 2017-03-30 NOTE — PROGRESS NOTES
Pt sitting in bed alert and calm. Significant other at bedside and assisting with getting patient to eat. Pt able to respond appropriately with short one to three word answers. But word salad when trying to hold a conversation. Lap belt in place, pt able to demonstrate how to remove belt. Pt not able to follow commands however, such as hand , deep breaths, etc. Skin still clear of breakdown. No contact dermatitis noted, but barrier cream applied after each incontinent episode. Pt able to turn self in bed. Exit alarm in place. Bed in low position. Will continue to round appropriately.

## 2017-03-30 NOTE — DIETARY
Nutrition Services Update: Pt seen for PO intake brief check.     Pt is a 76 yo F, admitted for Urinary tract infection and Altered mental status.     PMH: Dementia, HTN, GERD, Dyslipidemia, Alzheimer's disease, Urinary incontinence    Spoke with nursing about pt PO intake. Per RN, pt has difficulty communicating/speaking. Per most recent SLP note (3/27), pt unlikely to meet nutritional needs via PO diet. Per note, Cortrak placement not in pt's plan of care d/t agitation and confusion of pt. RN states pt did not eat much of breakfast. RN stated she ate most of her yogurt. RN stated pt would likely eat high protein milkshakes.     Current Diet Order: Full Liquid/Thins - PO intake from 3/29 ranges 25-75%.  Wt: Current bed scale wt from 3/24 is 52 kg (114 lb 10.2 oz).  BMI: 19.7  Pertinent Labs: Glucose 118, BUN 7  Pertinent Meds: Norvasc, Pericolace  Fluids: None listed in MAR at this time.   GI: Last BM 3/29  Skin: No skin breakdown noted at this time.     Plan/Recommendations:   1. Encourage PO intake  2. Continue Boost supplements TID  3. Advance diet as medically feasible  4. Nutrition Representative to see daily per dept policy  5. Please record intake as percentage of meals consumed  6. Record weekly weights to monitor fluid and nutrition status    RD following.

## 2017-03-30 NOTE — CARE PLAN
Problem: Nutritional:  Goal: Achieve adequate nutritional intake  Patient will consume ~50% of meals and supplements.  Outcome: PROGRESSING AS EXPECTED

## 2017-03-30 NOTE — DISCHARGE PLANNING
JOSÉ MANUEL received court documents stating that Dax Mann with [employer] is representing pt for a guardianship case.  Dax Mann requested to talk with MD regarding pt's prognosis. Phone number: [phone]. JOSÉ MANUEL notified [employer] MD.

## 2017-03-30 NOTE — PROGRESS NOTES
[facility] [place] [facility] [place]    Name Audrey Gauthier     1940   Age/Sex 77 y.o. female   MRN [id number]   Code Status DNAR     After 5PM or if no immediate response to page, please call for cross-coverage  Attending/Team: Dr Ramírez / kecia  Call [phone] to page   1st Call - Day Intern (R1):   SOTERO Patel 2nd Call - Day Sr. Resident (R2/R3):   Dr Marcano         Chief complaint/ reason for interval visit (Primary Diagnosis)   Altered mental status/ ?NPH vs worsening dementia    Interval Problem Daily Status Update    Overnight: No acute events  Subjective: Pt was alert and responsive this morning. She looked dull (not her usual self) when her partner was at the bedside.    - Her partner said that she is not drinking enough fluids. He wants to give her IVF. He said that she is going downhill   - Mucous membranes looked little dry. Restarted D5NS 83ml/hr.   -  left a phone number with the nurse for Drs to contact. Left a voice message to  to talk to the  and if need be we will talk to him.         Review of Systems   Unable to perform ROS: medical condition     Consultants/Specialty  None    Disposition  Inpatient for AMS evaluation and management    Quality Measures  Labs reviewed, Medications reviewed, EKG reviewed and Radiology images reviewed  Salcedo catheter: No Salcedo      DVT Prophylaxis: Heparin    Ulcer prophylaxis: Not indicated  : yes.          Physical Exam       Filed Vitals:    17 2000 17 0400 17 0800 17 1600   BP: 156/69 157/63 147/63 112/86   Pulse: 88 70 63 72   Temp: 36.8 °C (98.2 °F) 36.3 °C (97.3 °F) 36 °C (96.8 °F) 36.5 °C (97.7 °F)   TempSrc:       Resp: 18 18 16 18   Height:       Weight:       SpO2: 96% 94% 98% 96%     Body mass index is 19.67 kg/(m^2).    Oxygen Therapy:  Pulse Oximetry: 96 %, O2 (LPM): 0, O2 Delivery: None (Room Air)    Physical Exam   Constitutional: No distress.   Patient is alert  but not oriented. She looks very dull today.    HENT:   Head: Normocephalic and atraumatic.   Eyes: Conjunctivae and EOM are normal. Right eye exhibits no discharge. No scleral icterus.   Neck: Neck supple.   She resists movement of the neck   Cardiovascular: Normal rate, regular rhythm and normal heart sounds.  Exam reveals no gallop and no friction rub.    No murmur heard.  Pulmonary/Chest: Effort normal and breath sounds normal. She has no wheezes. She exhibits no tenderness.   Abdominal: Soft. Bowel sounds are normal. She exhibits no distension. There is no tenderness. There is no rebound.   Musculoskeletal: She exhibits no edema or tenderness.   Neurological:   Speech: Fluent but word salad  Strength: unable to assess but patient moving 4 extremities       Skin: Skin is warm. She is not diaphoretic.         Lab Data Review:      3/24/2017  1:17 PM    Recent Labs      03/28/17   0231  03/28/17   1538  03/29/17   0310   SODIUM  138  138  138   POTASSIUM  3.1*  3.8  3.6   CHLORIDE  107  107  107   CO2  21  23  24   BUN  4*  5*  7*   CREATININE  0.78  0.86  0.94   CALCIUM  9.1  9.4  9.4       Recent Labs      03/28/17   0231  03/28/17   1538  03/29/17   0310   GLUCOSE  105*  115*  118*       No results for input(s): RBC, HEMOGLOBIN, HEMATOCRIT, PLATELETCT, PROTHROMBTM, APTT, INR, IRON, FERRITIN, TOTIRONBC in the last 72 hours.              Assessment/Plan   Altered mental status  Assessment & Plan  Initially admitted with altered mentation beyond the baseline of her severe dementia. Medical workup was done to exclude causes of acute delirium. So far her LP, HSV, TSH, UA, RPR, B12 has been wnl. Her UDS was positive for Benzo. Her head CT showed dilated ventricles and there was a questionable NPH but we were not able to further assess her gait.  She has some leukocytosis on admission without evident source of infection and she was treated with 5 days of Unasyn for a presumable aspiration pneumonia.  Now she seems to  be at her baseline for her severe dementia per her boyfriend. She has been calm recently but not oriented even to herself.   Currently she is awaiting placement. Her sister who is her legal next of kin (since she is not  to boyfriend) has met with our internal medicine team to discuss goals of care. She recommended DNR and focusing on quality of life since she has advanced demential and her prognosis is poor. She recommended placing her in SNF and not sending her home with her boyfriend because she is concerned about un-safe home environment.   Plan  - Fall, seizures and aspiration precautions.  - Full liquid diet per speech recs  - PT/OT evaluation pending   - Continuing high dose thiamine for possible wernicke's encephalopathy. Thiamine 500mg TID x 2d. Started 250mg daily for 2 days. It is switched to oral thiamine.   - SW to follow up on SNF referral    Vascular dementia with behavior disturbance  Assessment & Plan  - has h/o dementia ( alziemer's and/or vascular). H/o agitation in the past. Seems to be at her baseline now (see above)  - Held memantine and donepezil, unsure if they will provide any additional benefits once demential is this advanced.    Leucocytosis  Assessment & Plan  - resolved  - leucocytosis with left shift  - UA, CXR, abd CT wnl.   - ?Aspiration  Plan  - CTM  - Unasyn (stop 3/28)    Hypertension  Assessment & Plan  BP has been on the higher site. We started 5mg amlodipine. Her blood pressure had a good response initial 2 days when she received it but apparently it has been challenging for nurses to administer it and she refuses multiple times.  - Will continue with PO amlodipine for now and make an effort to administer PO meds  - Will keep PRN IV hydralazine if BP is >180/100    Hypokalemia  Assessment & Plan  - Resolved  Now K is more stable pt has some PO intake  No need for daily monitoring    Hyperlipidemia  Assessment & Plan  - Initially simvastatin was changed to lipitor   - Pt  is not cooperative enough with PO medications. Benefits of statin therapy in her situation will be limited to advanced dementia.  - Will hold off lipid therapy    Vitamin D deficiency  Assessment & Plan  - Vit D 26  - held Vit D as patient is not taking anything orally    CKD (chronic kidney disease) stage 3, GFR 30-59 ml/min  Assessment & Plan  - Stable  - CTM    Overactive bladder  Assessment & Plan  - held tolterodine for now

## 2017-03-30 NOTE — THERAPY
"Physical Therapy Evaluation completed.   Bed Mobility:  Supine to Sit: Moderate Assist  Transfers: Sit to Stand: Maximal Assist  Gait: Level Of Assist: Unable to Participate with No Equipment Needed       Plan of Care: Will benefit from Physical Therapy 2 times per week  Discharge Recommendations: Equipment: Will Continue to Assess for Equipment Needs. Post-acute therapy Discharge to a transitional care facility for continued skilled therapy services.    See \"Rehab Therapy-Acute\" Patient Summary Report for complete documentation.     "

## 2017-03-30 NOTE — THERAPY
"Occupational Therapy Evaluation completed.   Functional Status:  Pt performing ADLs and bed mobility with max/total assist, verbalzing \"no\" for most directions, unclear baseline for cognition and PLOF. Pt would benefit from skilled acute services to maximize pt's participation with ADLs to decreased burden of care upon return home and will need to confirm pt's PLOF with primary caregiver.   Plan of Care: Will benefit from Occupational Therapy 2 times per week  Discharge Recommendations:  Equipment: Will Continue to Assess for Equipment Needs. Post-acute therapy Discharge to a transitional care facility for continued skilled therapy services.    See \"Rehab Therapy-Acute\" Patient Summary Report for complete documentation.    "

## 2017-03-30 NOTE — PROGRESS NOTES
Alert to self. Expressive aphasia. Incont of bowel and bladder. Eating minimally even when prompted.strip alarm and lap belt in place

## 2017-03-31 LAB
ALBUMIN SERPL BCP-MCNC: 4 G/DL (ref 3.2–4.9)
ALBUMIN/GLOB SERPL: 1.3 G/DL
ALP SERPL-CCNC: 92 U/L (ref 30–99)
ALT SERPL-CCNC: 21 U/L (ref 2–50)
ANION GAP SERPL CALC-SCNC: 7 MMOL/L (ref 0–11.9)
AST SERPL-CCNC: 22 U/L (ref 12–45)
BASOPHILS # BLD AUTO: 0.4 % (ref 0–1.8)
BASOPHILS # BLD: 0.04 K/UL (ref 0–0.12)
BILIRUB SERPL-MCNC: 0.5 MG/DL (ref 0.1–1.5)
BUN SERPL-MCNC: 12 MG/DL (ref 8–22)
CALCIUM SERPL-MCNC: 9.2 MG/DL (ref 8.5–10.5)
CHLORIDE SERPL-SCNC: 107 MMOL/L (ref 96–112)
CO2 SERPL-SCNC: 22 MMOL/L (ref 20–33)
CREAT SERPL-MCNC: 0.78 MG/DL (ref 0.5–1.4)
EOSINOPHIL # BLD AUTO: 0.22 K/UL (ref 0–0.51)
EOSINOPHIL NFR BLD: 1.9 % (ref 0–6.9)
ERYTHROCYTE [DISTWIDTH] IN BLOOD BY AUTOMATED COUNT: 39.8 FL (ref 35.9–50)
GFR SERPL CREATININE-BSD FRML MDRD: >60 ML/MIN/1.73 M 2
GLOBULIN SER CALC-MCNC: 3.1 G/DL (ref 1.9–3.5)
GLUCOSE SERPL-MCNC: 102 MG/DL (ref 65–99)
HCT VFR BLD AUTO: 37.6 % (ref 37–47)
HGB BLD-MCNC: 12.8 G/DL (ref 12–16)
IMM GRANULOCYTES # BLD AUTO: 0.07 K/UL (ref 0–0.11)
IMM GRANULOCYTES NFR BLD AUTO: 0.6 % (ref 0–0.9)
LYMPHOCYTES # BLD AUTO: 2.9 K/UL (ref 1–4.8)
LYMPHOCYTES NFR BLD: 25.6 % (ref 22–41)
MCH RBC QN AUTO: 29.2 PG (ref 27–33)
MCHC RBC AUTO-ENTMCNC: 34 G/DL (ref 33.6–35)
MCV RBC AUTO: 85.8 FL (ref 81.4–97.8)
MONOCYTES # BLD AUTO: 0.82 K/UL (ref 0–0.85)
MONOCYTES NFR BLD AUTO: 7.2 % (ref 0–13.4)
NEUTROPHILS # BLD AUTO: 7.29 K/UL (ref 2–7.15)
NEUTROPHILS NFR BLD: 64.3 % (ref 44–72)
NRBC # BLD AUTO: 0 K/UL
NRBC BLD AUTO-RTO: 0 /100 WBC
PLATELET # BLD AUTO: 218 K/UL (ref 164–446)
PMV BLD AUTO: 9.4 FL (ref 9–12.9)
POTASSIUM SERPL-SCNC: 4 MMOL/L (ref 3.6–5.5)
PROT SERPL-MCNC: 7.1 G/DL (ref 6–8.2)
RBC # BLD AUTO: 4.38 M/UL (ref 4.2–5.4)
SODIUM SERPL-SCNC: 136 MMOL/L (ref 135–145)
WBC # BLD AUTO: 11.3 K/UL (ref 4.8–10.8)

## 2017-03-31 PROCEDURE — 700102 HCHG RX REV CODE 250 W/ 637 OVERRIDE(OP): Performed by: INTERNAL MEDICINE

## 2017-03-31 PROCEDURE — 80053 COMPREHEN METABOLIC PANEL: CPT

## 2017-03-31 PROCEDURE — 770006 HCHG ROOM/CARE - MED/SURG/GYN SEMI*

## 2017-03-31 PROCEDURE — 700111 HCHG RX REV CODE 636 W/ 250 OVERRIDE (IP): Performed by: INTERNAL MEDICINE

## 2017-03-31 PROCEDURE — A9270 NON-COVERED ITEM OR SERVICE: HCPCS | Performed by: INTERNAL MEDICINE

## 2017-03-31 PROCEDURE — 36415 COLL VENOUS BLD VENIPUNCTURE: CPT

## 2017-03-31 PROCEDURE — 99231 SBSQ HOSP IP/OBS SF/LOW 25: CPT | Mod: GC | Performed by: INTERNAL MEDICINE

## 2017-03-31 PROCEDURE — 92526 ORAL FUNCTION THERAPY: CPT

## 2017-03-31 PROCEDURE — 85025 COMPLETE CBC W/AUTO DIFF WBC: CPT

## 2017-03-31 RX ADMIN — HEPARIN SODIUM 5000 UNITS: 5000 INJECTION INTRAVENOUS; SUBCUTANEOUS at 15:07

## 2017-03-31 RX ADMIN — Medication 100 MG: at 09:46

## 2017-03-31 RX ADMIN — AMLODIPINE BESYLATE 5 MG: 5 TABLET ORAL at 09:46

## 2017-03-31 RX ADMIN — HEPARIN SODIUM 5000 UNITS: 5000 INJECTION INTRAVENOUS; SUBCUTANEOUS at 21:07

## 2017-03-31 RX ADMIN — HEPARIN SODIUM 5000 UNITS: 5000 INJECTION INTRAVENOUS; SUBCUTANEOUS at 05:46

## 2017-03-31 ASSESSMENT — PAIN SCALES - GENERAL
PAINLEVEL_OUTOF10: 0
PAINLEVEL_OUTOF10: 0

## 2017-03-31 NOTE — PROGRESS NOTES
Pt alert, confused. Much more withdrawn today. Lying in bed. Large bowel movement noted. Complete bed change. Some excoriation noted around rectum, barrier cream applied. IVF infusing. Pt in lap belt last two shift, no attempts to climb out of bed noted. Lap belt removed. Exit alarm in place. Bed in low position. Will continue to round.

## 2017-03-31 NOTE — PROGRESS NOTES
Spoke to Ms Becky (patient's sister) again today about goals of care. She insisted DNAR, no aggressive measures, no re-hospitalization after she goes to SNF. She insisted that IV fluids be stopped as it does not treat the problem of not drinking water and she be allowed to drink whatever she can by mouth. IV fluids are stopped.     Also spoke to  Mr Dax Mann. He wanted to know if patient's current cognition is reversible. I told him that at this point her labs and imaging did not indicate any reversible conditions.

## 2017-03-31 NOTE — DISCHARGE PLANNING
Pt's sister and SO Jhonny are in agreement for a referral to [facility]. SW completed choice form and faxed to [facility].

## 2017-03-31 NOTE — DISCHARGE PLANNING
JOSÉ MANUEL had long conversation with pt's SO Jhonny this AM. Jhonny expressed that pt was ambulatory prior to admit and was able to eat soft foods, but he would usually spoon feed her and she required a lot of encouragement to eat.   JOSÉ MANUEL spoke to Kanchan with [employer]. She states there is currently an open case but that pt's SO has been responding well to their intervention and at this time it remains a save environment for the pt. She said prior to her admit they were discussing adding in-home caregivers and that Jhonny was agreeable to that. JOSÉ MANUEL faxed H&P and MAR to [facility] as requested. Phone: [phone] Fax: [phone]

## 2017-03-31 NOTE — THERAPY
"Speech Language Therapy dysphagia treatment completed.     Functional Status: Patient was seen on this date for dysphagia treatment with a full liquid meal tray. Significant other at bedside and providing direct assistance with feeding. Over >75% of meal tray was consumed. Significant other reported no difficulty or s/sx of aspiration. Minimum verbal output this session with majority being Y/N responses. Patient consumed 4 oz thin liquids (via cup sip and straw), 3 oz puree, and 1 oz soft solids (soft cookie) with SLP in room with no overt s/sx of aspiration. Swallow trigger timely and voice clear throughout trials. Significant other reporting patient can get orally defensive if fed by an unknown person but has adequate PO intake if fed by him. Significant other has been here for lunch and dinner to assist with feeding. He reported concern for patient meeting nutritional needs if upgraded to D2 d/t refusal eating D2 texture at baseline. At this time, recommend patient continue full liquid diet given direct assistance/supervision with feeding.     Recommendations: Full Liquid, Thin Liquids     Plan of Care: Will benefit from Speech Therapy 3 times per week    Post-Acute Therapy: Discharge to a transitional care facility for continued skilled therapy services.    See \"Rehab Therapy-Acute\" Patient Summary Report for complete documentation.     "

## 2017-03-31 NOTE — PROGRESS NOTES
[facility] [place]    Name Audrey Gauthier     1940   Age/Sex 77 y.o. female   MRN [id number]   Code Status DNAR     After 5PM or if no immediate response to page, please call for cross-coverage  Attending/Team: Dr Ramírez / kecia  Call [phone] to page   1st Call - Day Intern (R1):   SOTERO Patel 2nd Call - Day Sr. Resident (R2/R3):   Dr Marcano         Chief complaint/ reason for interval visit (Primary Diagnosis)   Altered mental status/ worsening dementia    Interval Problem Daily Status Update    Overnight: No acute events  Subjective: Pt was alert and responsive this morning.  She looks withdrawn. She did not want to be examined    - Spoke to her sister and . I wrote a separate progress note about the conversations.  - Stopped NS after conversation with her sister. Have told the nurse about the conversation with the sister.  - Does not require daily labs.    Review of Systems   Unable to perform ROS: medical condition     Consultants/Specialty  None    Disposition  Waiting for placement. Patient's sister and boyfriend are trying for guardianship.     Quality Measures  Labs reviewed, Medications reviewed, EKG reviewed and Radiology images reviewed  Salcedo catheter: No Salcedo      DVT Prophylaxis: Heparin    Ulcer prophylaxis: Not indicated  : nO.          Physical Exam       Filed Vitals:    17 1600 17 2000 17 0400 17 0800   BP: 112/86 101/63 152/68 140/80   Pulse: 72 83 73 75   Temp: 36.5 °C (97.7 °F) 36.3 °C (97.3 °F) 36.6 °C (97.8 °F) 36.4 °C (97.6 °F)   TempSrc:       Resp: 18 18 18 18   Height:       Weight:       SpO2: 96% 96% 94% 98%     Body mass index is 19.67 kg/(m^2).    Oxygen Therapy:  Pulse Oximetry: 98 %, O2 (LPM): 0, O2 Delivery: None (Room Air)    Physical Exam   Constitutional: No distress.   Patient is alert but not oriented.     Eyes: Conjunctivae and EOM are normal. Pupils are equal, round, and reactive to  light.   Neck:   Did not want to be examined   Cardiovascular:   Did not want to be examined   Pulmonary/Chest:   Did not want to be examined   Abdominal:   Did not want to be examined   Musculoskeletal:   Did not want to be examined   Neurological:   She looks withdrawn.Did not want to be examined     Skin: She is not diaphoretic.         Lab Data Review:      3/24/2017  1:17 PM    Recent Labs      03/28/17   1538  03/29/17 0310  03/31/17   0738   SODIUM  138  138  136   POTASSIUM  3.8  3.6  4.0   CHLORIDE  107  107  107   CO2  23  24  22   BUN  5*  7*  12   CREATININE  0.86  0.94  0.78   CALCIUM  9.4  9.4  9.2       Recent Labs      03/28/17   1538  03/29/17 0310  03/31/17   0738   ALTSGPT   --    --   21   ASTSGOT   --    --   22   ALKPHOSPHAT   --    --   92   TBILIRUBIN   --    --   0.5   GLUCOSE  115*  118*  102*       Recent Labs      03/31/17   0738   RBC  4.38   HEMOGLOBIN  12.8   HEMATOCRIT  37.6   PLATELETCT  218       Recent Labs      03/31/17   0738   WBC  11.3*   NEUTSPOLYS  64.30   LYMPHOCYTES  25.60   MONOCYTES  7.20   EOSINOPHILS  1.90   BASOPHILS  0.40   ASTSGOT  22   ALTSGPT  21   ALKPHOSPHAT  92   TBILIRUBIN  0.5           Assessment/Plan   Altered mental status  Assessment & Plan  Initially admitted AMS which was beyond the baseline of her severe dementia. Medical workup was done to exclude causes of acute delirium. So far her LP, HSV, TSH, UA, RPR, B12 has been wnl. Her UDS was positive for Benzo. Head CT and MRI brain showed dilated ventricles and there was a questionable NPH but we were not able to further assess her gait.  She had some leukocytosis on admission without evident source of infection and she was treated with 5 days of Unasyn for a presumable aspiration pneumonia.  Now she seems to be at her baseline per her boyfriend. She has been calm recently but not oriented even to herself.   Currently she is awaiting placement. Her sister who is her legal next of kin (since she is not   to boyfriend) has met with our internal medicine team to discuss goals of care. She recommended DNR and focusing on quality of life since she has advanced dementia and her prognosis is poor. She recommended placing her in SNF and not sending her home with her boyfriend because she is concerned about un-safe home environment.     Plan  - Fall, seizures and aspiration precautions.  - Full liquid diet per speech recs  - PT/OT evaluation pending   - Continuing high dose thiamine for possible wernicke's encephalopathy. Thiamine 500mg TID x 2d. Started 250mg daily for 2 days and it was switched to oral thiamine.   - SW to follow up on SNF referral    Vascular dementia with behavior disturbance  Assessment & Plan  - has h/o dementia ( alziemer's and/or vascular). H/o agitation in the past. Seems to be at her baseline now (see above)  - Held memantine and donepezil, unsure if they will provide any additional benefits as dementia is this advanced.    Leucocytosis  Assessment & Plan  - Resolved  - She had mild leucocytosis with left shift on admission. UA was abnormal but not diagnostic of UTI. BC, UC and CSF cultures were negative. Treated for possible aspiration pneumonia with 5 days unasyn.       Hypertension  Assessment & Plan  Has elevated BP. She was on permissive hypertension initially till stroke was ruled out. Started on PRN antihypertensives and 5mg amlodipine later. There was a good response in first 2 days when she received it but apparently it has been challenging for nurses to administer it and she refuses multiple times.  - Will continue with PO amlodipine for now and make an effort to administer PO meds  - Will keep PRN IV hydralazine if BP is >180/100    Hypokalemia  Assessment & Plan  - Resolved  Now K is more stable pt has some PO intake  No need for daily monitoring    Hyperlipidemia  Assessment & Plan  - Initially simvastatin was changed to lipitor   - Pt is not cooperative enough with PO  medications. Benefits of statin therapy in her situation will be limited due to advanced dementia.  - Will hold off lipid therapy    Vitamin D deficiency  Assessment & Plan  - Vit D 26  - held Vit D as patient is refusing oral meds    CKD (chronic kidney disease) stage 3, GFR 30-59 ml/min  Assessment & Plan  - Stable      Overactive bladder  Assessment & Plan  - held tolterodine for now

## 2017-04-01 PROCEDURE — A9270 NON-COVERED ITEM OR SERVICE: HCPCS | Performed by: INTERNAL MEDICINE

## 2017-04-01 PROCEDURE — 700111 HCHG RX REV CODE 636 W/ 250 OVERRIDE (IP): Performed by: INTERNAL MEDICINE

## 2017-04-01 PROCEDURE — 700102 HCHG RX REV CODE 250 W/ 637 OVERRIDE(OP): Performed by: INTERNAL MEDICINE

## 2017-04-01 PROCEDURE — 770006 HCHG ROOM/CARE - MED/SURG/GYN SEMI*

## 2017-04-01 PROCEDURE — 99231 SBSQ HOSP IP/OBS SF/LOW 25: CPT | Mod: GC | Performed by: INTERNAL MEDICINE

## 2017-04-01 RX ADMIN — AMLODIPINE BESYLATE 5 MG: 5 TABLET ORAL at 08:17

## 2017-04-01 RX ADMIN — HEPARIN SODIUM 5000 UNITS: 5000 INJECTION INTRAVENOUS; SUBCUTANEOUS at 13:59

## 2017-04-01 RX ADMIN — Medication 100 MG: at 08:17

## 2017-04-01 RX ADMIN — HEPARIN SODIUM 5000 UNITS: 5000 INJECTION INTRAVENOUS; SUBCUTANEOUS at 05:16

## 2017-04-01 RX ADMIN — HEPARIN SODIUM 5000 UNITS: 5000 INJECTION INTRAVENOUS; SUBCUTANEOUS at 21:15

## 2017-04-01 ASSESSMENT — PAIN SCALES - GENERAL
PAINLEVEL_OUTOF10: 0
PAINLEVEL_OUTOF10: 0

## 2017-04-01 NOTE — CARE PLAN
Problem: Psychosocial Needs:  Goal: Level of anxiety will decrease  Outcome: PROGRESSING AS EXPECTED    Problem: Respiratory:  Goal: Respiratory status will improve  Outcome: PROGRESSING AS EXPECTED

## 2017-04-01 NOTE — PROGRESS NOTES
Assumed care of pt. Nonverbal, says mostly yes/no. PIV in left forearm 22g, saline locked and flushing well. Full liquid diet. Incontinent of bowel and bladder. Plan is for SNF, [facility]. Bed alarm in place, call light in reach, bed in low position, will continue hourly rounding.

## 2017-04-01 NOTE — CARE PLAN
Problem: Safety  Goal: Will remain free from falls  Intervention: Assess risk factors for falls  High risk to fall due to impaired mobility, bed alarm in place      Problem: Urinary Elimination:  Goal: Ability to reestablish a normal urinary elimination pattern will improve  Intervention: Encourage scheduled voiding  Incontinent

## 2017-04-01 NOTE — PROGRESS NOTES
[facility] [place] ATTENDING NOTE:      Date & Time note created:   4/1/2017   2:36 PM     Visit Time:   Attending/resident bedside rounds 9-11:30 AM     The patient was evaluated with the resident staff.  I reviewed the resident's note and agree with the resident's findings and plan as documented in the resident's note except as documented in the attending note. Please reference resident daily note for complete information.The chart was reviewed and summarized.  Available labs, imaging, O2 sats, EKGs were reviewed. Available nursing, consultant, and resident notes were reviewed. I am actively involved in the patient's care.                                                                BRIEF DISCUSSION:                                                         Patient was asleep during my rounds with the residents. She looked comfortable. No family or visitors in the room.  Update received from resident.     Plan: DPOA status needs to be sorted out to appropriately to advance goals of care discussion. SW and Case Management assistance with that would be appreciated. From what I understand the current goals of care is to avoid aggressive and active interventions and focus on comfort measures.     Will re visit goals of care discussion with DPOA or Surrogate Decision maker.   ----------------------------------------------------------------------------------------------------------------------  Filed Vitals:    03/31/17 1600 03/31/17 2000 04/01/17 0400 04/01/17 0800   BP: 108/76 135/53 151/98 152/109   Pulse: 92 84 84 76   Temp: 36.3 °C (97.4 °F) 36.9 °C (98.4 °F) 36.2 °C (97.2 °F) 36.1 °C (97 °F)   TempSrc:       Resp: 18 18 18 18   Height:       Weight:       SpO2: 99% 100% 93% 99%     Weight/BMI: Body mass index is 19.67 kg/(m^2).  Pulse Oximetry: 99 %, O2 (LPM): 0, O2 Delivery: None (Room Air)  No intake or output data in the 24 hours ending 04/01/17 3512    Demi Briscoe MD   [employer]

## 2017-04-01 NOTE — PROGRESS NOTES
[facility] [place] Internal Medicine Interval Note    Name Audrey Gauthier     1940   Age/Sex 77 y.o. female   MRN [id number]   Code Status DNAR     After 5PM or if no immediate response to page, please call for cross-coverage  Attending/Team: Dr Briscoe / kecia  Call [phone] to page   1st Call - Day Intern (R1):   SOTERO Patel 2nd Call - Day Sr. Resident (R2/R3):   Dr Marcano         Chief complaint/ reason for interval visit (Primary Diagnosis)   Altered mental status/ worsening dementia    Interval Problem Daily Status Update    Overnight: No acute events  Subjective:  She was sleeping this morning    - Waiting for placement.     Review of Systems   Unable to perform ROS: medical condition     Consultants/Specialty  None    Disposition  Waiting for placement. Patient's sister and boyfriend are trying for guardianship.     Quality Measures  Labs reviewed, Medications reviewed, EKG reviewed and Radiology images reviewed  Salcedo catheter: No Salcedo      DVT Prophylaxis: Heparin    Ulcer prophylaxis: Not indicated  : nO.          Physical Exam       Filed Vitals:    17 1600 17 2000 17 0400 17 0800   BP: 108/76 135/53 151/98 152/109   Pulse: 92 84 84 76   Temp: 36.3 °C (97.4 °F) 36.9 °C (98.4 °F) 36.2 °C (97.2 °F) 36.1 °C (97 °F)   TempSrc:       Resp: 18 18 18 18   Height:       Weight:       SpO2: 99% 100% 93% 99%     Body mass index is 19.67 kg/(m^2).    Oxygen Therapy:  Pulse Oximetry: 99 %, O2 (LPM): 0, O2 Delivery: None (Room Air)    Physical Exam   Constitutional:   She was sleeping   Neurological:              Lab Data Review:      3/24/2017  1:17 PM    Recent Labs      17   0738   SODIUM  136   POTASSIUM  4.0   CHLORIDE  107   CO2  22   BUN  12   CREATININE  0.78   CALCIUM  9.2       Recent Labs      17   0738   ALTSGPT  21   ASTSGOT  22   ALKPHOSPHAT  92   TBILIRUBIN  0.5   GLUCOSE  102*       Recent Labs      17   0738   RBC  4.38   HEMOGLOBIN   12.8   HEMATOCRIT  37.6   PLATELETCT  218       Recent Labs      03/31/17   0738   WBC  11.3*   NEUTSPOLYS  64.30   LYMPHOCYTES  25.60   MONOCYTES  7.20   EOSINOPHILS  1.90   BASOPHILS  0.40   ASTSGOT  22   ALTSGPT  21   ALKPHOSPHAT  92   TBILIRUBIN  0.5           Assessment/Plan   Altered mental status  Assessment & Plan  Initially admitted AMS which was beyond the baseline of her severe dementia. Medical workup was done to exclude causes of acute delirium. So far her LP, HSV, TSH, UA, RPR, B12 has been wnl. Her UDS was positive for Benzo. Head CT and MRI brain showed dilated ventricles and there was a questionable NPH but we were not able to further assess her gait.  She had some leukocytosis on admission without evident source of infection and she was treated with 5 days of Unasyn for a presumable aspiration pneumonia.  Now she seems to be at her baseline per her boyfriend. She has been calm recently but not oriented even to herself.   Currently she is awaiting placement. Her sister who is her legal next of kin (since she is not  to boyfriend) has met with our internal medicine team to discuss goals of care. She recommended DNR and focusing on quality of life since she has advanced dementia and her prognosis is poor. She recommended placing her in SNF and not sending her home with her boyfriend because she is concerned about un-safe home environment.     Plan  - Fall, seizures and aspiration precautions.  - Full liquid diet per speech recs  - PT/OT evaluation pending   - Continuing high dose thiamine for possible wernicke's encephalopathy. Thiamine 500mg TID x 2d. Started 250mg daily for 2 days and it was switched to oral thiamine.   - SW to follow up on SNF referral    Vascular dementia with behavior disturbance  Assessment & Plan  - has h/o dementia ( alziemer's and/or vascular). H/o agitation in the past. Seems to be at her baseline now (see above)  - Held memantine and donepezil, unsure if they will  provide any additional benefits as dementia is this advanced.    Leucocytosis  Assessment & Plan  - Resolved  - She had mild leucocytosis with left shift on admission. UA was abnormal but not diagnostic of UTI. BC, UC and CSF cultures were negative. Treated for possible aspiration pneumonia with 5 days unasyn.       Hypertension  Assessment & Plan  Has elevated BP. She was on permissive hypertension initially till stroke was ruled out. Started on PRN antihypertensives and 5mg amlodipine later. There was a good response in first 2 days when she received it but apparently it has been challenging for nurses to administer it and she refuses multiple times.  - Will continue with PO amlodipine for now and make an effort to administer PO meds  - Will keep PRN IV hydralazine if BP is >180/100    Hypokalemia  Assessment & Plan  - Resolved  Now K is more stable pt has some PO intake  No need for daily monitoring    Hyperlipidemia  Assessment & Plan  - Initially simvastatin was changed to lipitor   - Pt is not cooperative enough with PO medications. Benefits of statin therapy in her situation will be limited due to advanced dementia.  - Will hold off lipid therapy    Vitamin D deficiency  Assessment & Plan  - Vit D 26  - held Vit D as patient is refusing oral meds    CKD (chronic kidney disease) stage 3, GFR 30-59 ml/min  Assessment & Plan  - Stable      Overactive bladder  Assessment & Plan  - held tolterodine for now

## 2017-04-02 PROCEDURE — 700102 HCHG RX REV CODE 250 W/ 637 OVERRIDE(OP): Performed by: INTERNAL MEDICINE

## 2017-04-02 PROCEDURE — A9270 NON-COVERED ITEM OR SERVICE: HCPCS | Performed by: INTERNAL MEDICINE

## 2017-04-02 PROCEDURE — 770006 HCHG ROOM/CARE - MED/SURG/GYN SEMI*

## 2017-04-02 PROCEDURE — 700111 HCHG RX REV CODE 636 W/ 250 OVERRIDE (IP): Performed by: INTERNAL MEDICINE

## 2017-04-02 RX ADMIN — Medication 100 MG: at 09:04

## 2017-04-02 RX ADMIN — AMLODIPINE BESYLATE 5 MG: 5 TABLET ORAL at 09:04

## 2017-04-02 RX ADMIN — HEPARIN SODIUM 5000 UNITS: 5000 INJECTION INTRAVENOUS; SUBCUTANEOUS at 22:00

## 2017-04-02 RX ADMIN — HEPARIN SODIUM 5000 UNITS: 5000 INJECTION INTRAVENOUS; SUBCUTANEOUS at 14:03

## 2017-04-02 RX ADMIN — HEPARIN SODIUM 5000 UNITS: 5000 INJECTION INTRAVENOUS; SUBCUTANEOUS at 05:09

## 2017-04-02 NOTE — CARE PLAN
Problem: Infection  Goal: Will remain free from infection  Outcome: PROGRESSING AS EXPECTED  Standard precautions in place    Problem: Pain Management  Goal: Pain level will decrease to patient’s comfort goal  Outcome: PROGRESSING AS EXPECTED  Pain assessed q4hrs and medicated per Mar

## 2017-04-02 NOTE — DISCHARGE SUMMARY
[facility] [place] [place] Internal Medicine Discharge Summary      Admit Date:  3/23/2017       Discharge Date: ***    Service:   [facility] Internal Medicine green Team  Attending Physician(s): Dr. Ramírez/Dr Briscoe  Senior Resident(s): Dr. Marcano  Nikita Resident(s): Dr. Harrell      Primary Diagnosis:   Worsening dementia        Secondary Diagnoses:                Active Hospital Problems    Diagnosis   • Altered mental status [R41.82]   • Hypertension [I10]   • Leucocytosis [D72.829]   • Vascular dementia with behavior disturbance [F01.51]   • Overactive bladder [N32.81]   • CKD (chronic kidney disease) stage 3, GFR 30-59 ml/min [N18.3]   • Vitamin D deficiency [E55.9]   • Hyperlipidemia [E78.5]   • Hypokalemia [E87.6]       Hospital Summary (Brief Narrative):       Initially admitted for AMS which was beyond the baseline of her severe dementia. Medical workup was done to exclude causes of acute delirium. Lumbar puncture was done on 03/24/2017. CSF (cell counts, culture, HSV) was within normal limits.  TSH, urinalysis, RPR, B12 has been wnl. Urine drug screen was positive for Benzo (she got it an ER before the test). Head CT and MRI brain showed dilated ventricles and there was a questionable NPH but we were not able to further assess her gait, opening pressure during lumbar puncture was 12 cmH2O. Carotid USG showed < 50% stenosis in bilateral carotid arteries. She had mild leukocytosis on admission without evident source of infection and she was treated with 5 days of Unasyn for a presumable aspiration pneumonia. She got IV and oral thiamine for possible Wernicke's encephalopathy as she has history of alcoholism. It has been a challenge to feed her, she tends to eat better when her boyfriend is around.  Medications like donepezil, memantine, Vitamin D, B12, tolterodine and simvastatin were held as she was not willing to take medications and these medications would not change her prognosis given her severe dementia.   Her mental  status came back to baseline on second day of admission per her boyfriend but since last few days she has been withdrawing and has been less active which may be due to worsening dementia and or for being in unfamiliar environment.    Her sister who is her legal next of kin (since she is not  to her boyfriend). She met with our team to discuss goals of care. She recommended DNR and focusing on quality of life since she has advanced dementia and her prognosis is poor. She wants no aggressive treatment like antibiotics, no tube feeds, no IV fluids and no readmission to hospital after she goes to SNF. She recommended placing her in SNF and not sending her home with her boyfriend because she is concerned about un-safe home environment.  She and patient's boyfriend are in the process of claiming guardianship and both are working with  in this regard.     Patient /Hospital Summary (Details -- Problem Oriented) :       Altered mental status  As stated above      Vascular dementia with behavior disturbance  has h/o dementia ( alziemer's and/or vascular). H/o agitation in the past. Seems to be at her baseline now (see above).  Held memantine and donepezil, unsure if they will provide any additional benefits as dementia is advanced.    Leucocytosis  She had mild leucocytosis with left shift on admission. UA was abnormal but not diagnostic of UTI. BC, UC and CSF cultures were negative. Treated for possible aspiration pneumonia with 5 days unasyn.       Hypertension  She had elevated BP. She was on permissive hypertension initially till stroke was ruled out. Started on PRN antihypertensives and 5mg amlodipine later. There was a good response in first 2 days when she received it but apparently it was challenging for nurses to administer it and she refused multiple times. Discharging on ***      Hypokalemia  Improved after replacing with potassium chloride.     Hyperlipidemia  Initially simvastatin was changed to  "lipitor due to suspicion of stroke.  Pt was  not cooperative enough with PO medications. Held lipitor as benefits of statin therapy in her situation will be limited due to advanced dementia and age.     Vitamin D deficiency  Vit D is 26. Held Vit D as patient was refusing oral meds. Will resume on discharge    CKD (chronic kidney disease) stage 3, GFR 30-59 ml/min  It was stable.    Overactive bladder  H/o overactive bladder. Held tolterodine as patient was resisting oral medications.          Consultants:     None    Procedures:        Lumbar puncture on 03/24/2017    Imaging/ Testing:      CAROTID DUPLEX   Final Result      MR-BRAIN-WITH & W/O   Final Result      1.  Marked ventriculomegaly disproportionate to the sulcal markings. However, there is enlargement of the sylvian fissures which can be a feature of normal pressure hydrocephalus. There is a paucity of sulcal markings over the vertex suggesting the    so-called \"high convexity tightness sign\". These findings have been described associated with the syndrome of normal pressure hydrocephalus.   2.  Advanced supratentorial white matter disease most consistent with microvascular ischemic change.   3.  Focal and streaky areas of gradient echo hypointensity consistent with hemosiderin deposition in the anterior subcortical frontal lobes and right parietal lobe. These are most consistent with old microhemorrhage related to amyloid angiopathy. No    significant change from 12/26/2014.   4.  No evidence of acute cerebral infarction, acute hemorrhage, or mass lesion.      ECHOCARDIOGRAM COMP W/O CONT   Final Result      CT-HEAD W/O   Final Result         1.  Bilateral ventricular dilatation, appears increased since prior study. Could represent progressive atrophic changes, however appearance raises concern for component of hydrocephalus.   2.  Otherwise no acute intracranial abnormality identified   3.  Changes of small vessel ischemia.   4.  Atherosclerosis      "   These findings were discussed with the patient's clinician, Candy Castaneda, on 3/24/2017 3:11 AM.      CT-RENAL COLIC EVALUATION(A/P W/O)   Final Result         1.  No acute abnormality.   2.  Groundglass pulmonary nodule, recommend follow-up CT chest in 3 months for evaluation of stability.   3.  Small bilateral fat-containing inguinal hernias.   4.  Atherosclerosis.   5.  Diverticulosis      These findings were discussed with the patient's clinician, Stanley Hunter, on 3/24/2017 1:22 AM.      DX-CHEST-PORTABLE (1 VIEW)   Final Result         1.  Left basilar atelectasis or scarring, no focal infiltrate. Exam similar to prior            Discharge Medications:         Medication Reconciliation: Completed  Run .DCMEDLIST  after completing discharge medicine reconciliation and prior to signing note (DCMEDLIST is more directed for the patient)  Can use .DISCHARGEMEDSLIST if going to another facility         Disposition:   SNF ***    Diet:  Regular     Activity:  As tolerated    Instructions:         The patient was instructed to return to the ER in the event of worsening symptoms. I have counseled the patient on the importance of compliance and the patient has agreed to proceed with all medical recommendations and follow up plan indicated above.   The patient understands that all medications come with benefits and risks. Risks may include permanent injury or death and these risks can be minimized with close reassessment and monitoring.        Primary Care Provider:    Tamia Saenz M.D.    Discharge summary faxed to primary care provider:  Deferred  Copy of discharge summary given to the patient: Deferred    Follow up appointment details :      ***  Time spent on discharge day patient visit, preparing discharge paperwork and arranging for patient follow up.      Discharge Time (Minutes) :  45min      Condition on Discharge    RUN .2016ConditionOnDischarge to include the patients condition on discharge with  the DC summary  This is basically an interval history and exam for the day of discharge and includes the most recent labs, can be used as your progress note for the day

## 2017-04-02 NOTE — PROGRESS NOTES
Pt oriented to self only. Pt speaks a few phrases normally but the rest is word salad. KUHN, incontinent of bowel and bladder. No signs of distress noted. SO at bedside. Bed alarm on, call light in reach.

## 2017-04-02 NOTE — PROGRESS NOTES
[facility] [place] Internal Medicine Interval Note    Name Audrey Gauthier     1940   Age/Sex 77 y.o. female   MRN [id number]   Code Status DNAR     After 5PM or if no immediate response to page, please call for cross-coverage  Attending/Team: Dr Briscoe / kecia  Call [phone] to page   1st Call - Day Intern (R1):   SOTERO Patel 2nd Call - Day Sr. Resident (R2/R3):   Dr Marcano         Chief complaint/ reason for interval visit (Primary Diagnosis)   Altered mental status/ worsening dementia    Interval Problem Daily Status Update    Overnight: No acute events  Subjective: No active complaints. She did not want to answer the questions.     - She is accepted in SNF. Awaiting confirmation from SW to discharge.   - During discahrge: Will resume vitamin D, vitamin B12 and thiamine , continue amlodipine and will hold donepezil, memantine, Simvastatin and tolerodine..     Review of Systems   Unable to perform ROS: medical condition     Consultants/Specialty  None    Disposition  Waiting for placement. Patient's sister and boyfriend are trying for guardianship.     Quality Measures  Labs reviewed, Medications reviewed, EKG reviewed and Radiology images reviewed  Salcedo catheter: No Salcedo      DVT Prophylaxis: Heparin    Ulcer prophylaxis: Not indicated  : nO.          Physical Exam       Filed Vitals:    17 1600 17 2000 17 0400 17 0748   BP: 124/68 131/63 103/60 140/73   Pulse: 89 98 77 83   Temp: 36.7 °C (98 °F) 36.1 °C (97 °F) 36.6 °C (97.8 °F) 36.5 °C (97.7 °F)   TempSrc:       Resp: 18 18 18 18   Height:       Weight:       SpO2: 95% 94% 100% 99%     Body mass index is 19.67 kg/(m^2).    Oxygen Therapy:  Pulse Oximetry: 99 %, O2 (LPM): 0, O2 Delivery: None (Room Air)    Physical Exam   Constitutional:   Frail looking female. Talks minimally. She does not want to talk and be examined   Neurological:              Lab Data Review:      3/24/2017  1:17 PM    Recent Labs       03/31/17   0738   SODIUM  136   POTASSIUM  4.0   CHLORIDE  107   CO2  22   BUN  12   CREATININE  0.78   CALCIUM  9.2       Recent Labs      03/31/17   0738   ALTSGPT  21   ASTSGOT  22   ALKPHOSPHAT  92   TBILIRUBIN  0.5   GLUCOSE  102*       Recent Labs      03/31/17   0738   RBC  4.38   HEMOGLOBIN  12.8   HEMATOCRIT  37.6   PLATELETCT  218       Recent Labs      03/31/17   0738   WBC  11.3*   NEUTSPOLYS  64.30   LYMPHOCYTES  25.60   MONOCYTES  7.20   EOSINOPHILS  1.90   BASOPHILS  0.40   ASTSGOT  22   ALTSGPT  21   ALKPHOSPHAT  92   TBILIRUBIN  0.5           Assessment/Plan   Altered mental status  Assessment & Plan  Initially admitted AMS which was beyond the baseline of her severe dementia. Medical workup was done to exclude causes of acute delirium. So far her LP, HSV, TSH, UA, RPR, B12 has been wnl. Her UDS was positive for Benzo. Head CT and MRI brain showed dilated ventricles and there was a questionable NPH but we were not able to further assess her gait.  She had some leukocytosis on admission without evident source of infection and she was treated with 5 days of Unasyn for a presumable aspiration pneumonia.  Now she seems to be at her baseline per her boyfriend. She has been calm recently but not oriented even to herself.   Currently she is awaiting placement. Her sister who is her legal next of kin (since she is not  to boyfriend) has met with our internal medicine team to discuss goals of care. She recommended DNR and focusing on quality of life since she has advanced dementia and her prognosis is poor. She recommended placing her in SNF and not sending her home with her boyfriend because she is concerned about un-safe home environment.     Plan  - Fall, seizures and aspiration precautions.  - Full liquid diet per speech recs  - PT/OT evaluation pending   - For possible wernicke's encephalopathy, s/p Thiamine 500mg TID x 2d. Started 250mg daily for 2 days and it was switched to oral thiamine.   -  SW to follow up on SNF referral    Vascular dementia with behavior disturbance  Assessment & Plan  - has h/o dementia (alziemer's and/or vascular). H/o agitation in the past. Seems to be at her baseline now (see above)  - Held donepezil and memantine, unsure of the benefit given her severe dementia.     Leucocytosis  Assessment & Plan  - Resolved  - She had mild leucocytosis with left shift on admission. UA was abnormal but not diagnostic of UTI. BC, UC and CSF cultures were negative. Treated for possible aspiration pneumonia with 5 days unasyn.       Hypertension  Assessment & Plan  Has elevated BP. She was on permissive hypertension initially till stroke was ruled out. Started on PRN antihypertensives and 5mg amlodipine later. There was a good response in first 2 days when she received it but apparently it has been challenging for nurses to administer it and she refuses multiple times.  - Will continue with PO amlodipine for now and make an effort to administer PO meds  - Will keep PRN IV hydralazine if BP is >180/100    Hypokalemia  Assessment & Plan  - Resolved  - Now K is more stable pt has some PO intake  - No need for daily monitoring    Hyperlipidemia  Assessment & Plan  - Initially simvastatin was changed to lipitor   - Pt is not cooperative enough with PO medications. Benefits of statin therapy in her situation will be limited due to advanced dementia.  - Will hold off lipid therapy    Vitamin D deficiency  Assessment & Plan  - Vit D 26  - held Vit D as patient is refusing oral meds. Will resume during discharge    CKD (chronic kidney disease) stage 3, GFR 30-59 ml/min  Assessment & Plan  - Stable      Overactive bladder  Assessment & Plan  - held tolterodine for now. Will resume on discharge

## 2017-04-02 NOTE — PROGRESS NOTES
[facility] [place] INTERNAL MEDICINE ATTENDING NOTE:      Date & Time note created:   4/2/2017   12:42 PM     Visit Time:   Attending/resident bedside rounds 9-11:30 AM     The patient was evaluated with the resident staff.  I reviewed the resident's note and agree with the resident's findings and plan as documented in the resident's note except as documented in the attending note. Please reference resident daily note for complete information.The chart was reviewed and summarized.  Available labs, imaging, O2 sats, EKGs were reviewed. Available nursing, consultant, and resident notes were reviewed. I am actively involved in the patient's care.                                                                BRIEF DISCUSSION:                                                         77 y/f admitted for AMS    // Encephalopathy, AMS: Extensive investigation with LP, HSV, TSH, UA, RPR, B12 were all negative. UDS was positive for Benzo Head CT and MRI brain showed dilated ventricles with a question of NPH. Her gait could not be assessed. Leukocytosis on admission without any obvious source of infection was treated with 5 days of Unasyn for a presumable aspiration pneumonia.We will continue Oral thiamine d/t her history of alcohol use. Will add MVI and Folic Acid.     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 SNF/senior living care with comfort measures recommended.     // HTN: BP ok, may continue amlodipine.     Goals of Care: From my chart review and resident report I understand  Sister(surrogate decision maker) and Boyfriend are both agreeable for transitional care facility transfer. From my team's conversation earlier with sister the focus has been on Comfort measures which we will continue to assist with.    SW and CM to assist in transition. Do not hesitate to organize a family meeting if a need be.      ----------------------------------------------------------------------------------------------------------------------  Filed Vitals:    04/01/17 1600 04/01/17 2000 04/02/17 0400 04/02/17 0748   BP: 124/68 131/63 103/60 140/73   Pulse: 89 98 77 83   Temp: 36.7 °C (98 °F) 36.1 °C (97 °F) 36.6 °C (97.8 °F) 36.5 °C (97.7 °F)   TempSrc:       Resp: 18 18 18 18   Height:       Weight:       SpO2: 95% 94% 100% 99%     Weight/BMI: Body mass index is 19.67 kg/(m^2).  Pulse Oximetry: 99 %, O2 (LPM): 0, O2 Delivery: None (Room Air)  No intake or output data in the 24 hours ending 04/02/17 6467    Demi Briscoe MD   [employer]

## 2017-04-03 VITALS
HEIGHT: 64 IN | BODY MASS INDEX: 19.57 KG/M2 | RESPIRATION RATE: 18 BRPM | OXYGEN SATURATION: 95 % | HEART RATE: 64 BPM | DIASTOLIC BLOOD PRESSURE: 87 MMHG | WEIGHT: 114.64 LBS | SYSTOLIC BLOOD PRESSURE: 128 MMHG | TEMPERATURE: 97.6 F

## 2017-04-03 PROCEDURE — 302146: Performed by: INTERNAL MEDICINE

## 2017-04-03 PROCEDURE — 97530 THERAPEUTIC ACTIVITIES: CPT

## 2017-04-03 PROCEDURE — 700102 HCHG RX REV CODE 250 W/ 637 OVERRIDE(OP): Performed by: INTERNAL MEDICINE

## 2017-04-03 PROCEDURE — A9270 NON-COVERED ITEM OR SERVICE: HCPCS | Performed by: INTERNAL MEDICINE

## 2017-04-03 PROCEDURE — 700111 HCHG RX REV CODE 636 W/ 250 OVERRIDE (IP): Performed by: INTERNAL MEDICINE

## 2017-04-03 RX ORDER — GUAIFENESIN/DEXTROMETHORPHAN 100-10MG/5
10 SYRUP ORAL EVERY 6 HOURS PRN
Qty: 560 ML | Refills: 1 | Status: SHIPPED | OUTPATIENT
Start: 2017-04-03

## 2017-04-03 RX ORDER — AMLODIPINE BESYLATE 5 MG/1
5 TABLET ORAL DAILY
Qty: 30 TAB | Refills: 1 | Status: SHIPPED | OUTPATIENT
Start: 2017-04-03

## 2017-04-03 RX ORDER — LANOLIN ALCOHOL/MO/W.PET/CERES
100 CREAM (GRAM) TOPICAL DAILY
Qty: 30 TAB | Refills: 1 | Status: SHIPPED | OUTPATIENT
Start: 2017-04-03

## 2017-04-03 RX ADMIN — HEPARIN SODIUM 5000 UNITS: 5000 INJECTION INTRAVENOUS; SUBCUTANEOUS at 14:34

## 2017-04-03 RX ADMIN — AMLODIPINE BESYLATE 5 MG: 5 TABLET ORAL at 08:31

## 2017-04-03 RX ADMIN — HEPARIN SODIUM 5000 UNITS: 5000 INJECTION INTRAVENOUS; SUBCUTANEOUS at 05:29

## 2017-04-03 NOTE — CARE PLAN
Problem: Safety  Goal: Will remain free from injury  Outcome: PROGRESSING AS EXPECTED  Bed alarm in place    Problem: Urinary Elimination:  Goal: Ability to reestablish a normal urinary elimination pattern will improve  Outcome: PROGRESSING SLOWER THAN EXPECTED  Incontinent of B&B

## 2017-04-03 NOTE — DISCHARGE INSTRUCTIONS
Discharge Instructions    Discharged to other by ambulance with self. Discharged via ambulance, hospital escort: Refused.  Special equipment needed: Not Applicable    Be sure to schedule a follow-up appointment with your primary care doctor or any specialists as instructed.     Discharge Plan:   Influenza Vaccine Indication: Not indicated: Previously immunized this influenza season and > 8 years of age    I understand that a diet low in cholesterol, fat, and sodium is recommended for good health. Unless I have been given specific instructions below for another diet, I accept this instruction as my diet prescription.   Other diet: full liquid nectar thick    Special Instructions: None    · Is patient discharged on Warfarin / Coumadin?   No     · Is patient Post Blood Transfusion?  No    Depression / Suicide Risk    As you are discharged from this [facility] facility, it is important to learn how to keep safe from harming yourself.    Recognize the warning signs:  · Abrupt changes in personality, positive or negative- including increase in energy   · Giving away possessions  · Change in eating patterns- significant weight changes-  positive or negative  · Change in sleeping patterns- unable to sleep or sleeping all the time   · Unwillingness or inability to communicate  · Depression  · Unusual sadness, discouragement and loneliness  · Talk of wanting to die  · Neglect of personal appearance   · Rebelliousness- reckless behavior  · Withdrawal from people/activities they love  · Confusion- inability to concentrate     If you or a loved one observes any of these behaviors or has concerns about self-harm, here's what you can do:  · Talk about it- your feelings and reasons for harming yourself  · Remove any means that you might use to hurt yourself (examples: pills, rope, extension cords, firearm)  · Get professional help from the community (Mental Health, Substance Abuse, psychological counseling)  · Do not be  alone:Call your Safe Contact- someone whom you trust who will be there for you.  · Call your local CRISIS HOTLINE 216-4504 or 936-761-2069  · Call your local Children's Mobile Crisis Response Team [place] (630) 421-1259 or www.Sur[employer]ying [employer] (SAM)  · Call the toll free National Suicide Prevention Hotlines   · National Suicide Prevention Lifeline 357-595-VTMW (9750)  · National [employer] Line Network 800-SUICIDE (233-4768)

## 2017-04-03 NOTE — PROGRESS NOTES
Pt oriented to self only. Pt speaks a few phrases normally but the rest is word salad. KUHN, incontinent of bowel and bladder. Resisting medications and turns. No signs of distress noted. SO at bedside. Pt removed PIV this evening. Bed alarm on, call light in reach.

## 2017-04-03 NOTE — PROGRESS NOTES
No change in neuro status. Eating with assistance. Multiple bowel movements, loose, stool softeners held. Incontinent of B&B. Turning self. VSS

## 2017-04-03 NOTE — CARE PLAN
Problem: Bowel/Gastric:  Goal: Will not experience complications related to bowel motility  Outcome: PROGRESSING AS EXPECTED  Pt having normal bowel pattern.     Problem: Urinary Elimination:  Goal: Ability to reestablish a normal urinary elimination pattern will improve  Outcome: PROGRESSING SLOWER THAN EXPECTED  Pt incontinent of urine

## 2017-04-03 NOTE — DISCHARGE PLANNING
Received PCS form from Carolny(JOSÉ MANUEL). Arranged patients transport to [facility] at 1530 via [facility]. Marnie Garay([facility]) and Carolyn(JOSÉ MANUEL) notified of transport time.

## 2017-04-03 NOTE — DISCHARGE PLANNING
JOSÉ MANUEL spoke with physician who advised that pt is medically cleared to dc to SNF and will complete dc summary this afternoon.  Transport form and [facility] PCS form faxed to [facility] requesting 1500 transport time. Awaiting verification of transport time and DC Summary.

## 2017-04-03 NOTE — DISCHARGE PLANNING
JOSÉ MANUEL spoke with Marily from [facility] who advised pt has been accepted to [facility] and they have a bed for her today. SW updated pt's sister Brenda that we will work on transferring pt today and SW will call back when a time is arranged.

## 2017-04-03 NOTE — PROGRESS NOTES
Pt transferred to [facility] via [facility]; cobra and RX sent with [facility]  Pt has belongings with her in blue bag, pt refused to get dressed, gown in place   report given to receiving RN at [facility]

## 2017-04-03 NOTE — PROGRESS NOTES
Assumed care of pt; pt oriented to self, and able to answer yes or no to other orientation questions, pt answered correctly to place and name; other verbal communication is word salad; pt agreed to take one med, refusing the rest; pt resting comfortably in bed at this time, no signs of distress; no IV access; safety precautions in place; near nurses station; hourly rounding

## 2017-04-03 NOTE — DISCHARGE SUMMARY
[facility]      Admit Date:  3/23/2017       Discharge Date:   4/3/2017    Service:   [facility] Green Team  Attending Physician(s):   Rachna  Senior Resident(s):   Jeet  Nikita Resident(s):   True      Primary Diagnosis:   Severe dementia and altered mental status    Secondary Diagnoses:                Active Hospital Problems    Diagnosis   • Altered mental status [R41.82]   • Hypertension [I10]   • Leucocytosis [D72.829]   • Vascular dementia with behavior disturbance [F01.51]   • Overactive bladder [N32.81]   • CKD (chronic kidney disease) stage 3, GFR 30-59 ml/min [N18.3]   • Vitamin D deficiency [E55.9]   • Hyperlipidemia [E78.5]   • Hypokalemia [E87.6]       Hospital Summary (Brief Narrative):       H&P per Dr. Castaneda:  Ms. Gauthier is a 76 yo female with PMH of dementia with behavioral disturbance,(alzheimers + dementia)  HTN, DLD, GERD, vitamin D deficiency brought in by her significant other for worsening mental status. At baseline she is unable to take care of herself, needs 24/7 supervision for ADL and IADL. She is usually alert and agitated sometimes but today her SO noted that she did not eat her breakfast. She appeared drowsy, her speech is incoherent.    He did not notice fever, cough, shortness of breath, no loss of conciousness, no diarrhea. No abnormal jerky movements.     In the ED /68 mmHg  Pulse 78  Temp(Src) 36.4 °C (97.5 °F) (Temporal)  Resp 18   SpO2 91% POC UA showed blood and small LE. CT head - empiric antibiotics were started to cover for meningitis, discussed with SO about the need for LP but he declined it at this time but he is agreeable to treating with antibiotics.     Patient was admitted for acute UTI and behavioral disturbance. Patient's dementia was understood to be worse than her baseline and workup to exclude acute dementia was negative. UDS was positive for Benzo. Head CT and MRI brain showed dilated ventricles,  questionable NPH but we were unable to assess her gait. She had luekocytosis on admission without an apparent source of infection and she was treated with unasyn for 5 days for a possible aspiration event/pneumonia. UA was abnormal but not diagnostic of UTI, BCx, UCx, CSF cultures were all NGTD. Per her boyfriend, she is currently at her mental status baseline.Her next of kin is her sister who would like her to go to a SNF and not home with the boyfriend.    She will be transferred to SNF [facility].      Patient /Hospital Summary (Details -- Problem Oriented) :          Altered mental status  Assessment & Plan  Initially admitted AMS which was beyond the baseline of her severe dementia. Medical workup was done to exclude causes of acute delirium. So far her LP, HSV, TSH, UA, RPR, B12 has been wnl. Her UDS was positive for Benzo. Head CT and MRI brain showed dilated ventricles and there was a questionable NPH but we were not able to further assess her gait.  She had some leukocytosis on admission without evident source of infection and she was treated with 5 days of Unasyn for a presumable aspiration pneumonia.  Now she seems to be at her baseline per her boyfriend. She has been calm recently but not oriented even to herself.   Currently she is awaiting placement. Her sister who is her legal next of kin (since she is not  to boyfriend) has met with our internal medicine team to discuss goals of care. She recommended DNR and focusing on quality of life since she has advanced dementia and her prognosis is poor. She recommended placing her in SNF and not sending her home with her boyfriend because she is concerned about un-safe home environment.     Plan  - Fall, seizures and aspiration precautions.  - Full liquid diet per speech recs  - PT/OT evaluation pending   - For possible wernicke's encephalopathy, s/p Thiamine 500mg TID x 2d. Started 250mg daily for 2 days and it was switched to oral thiamine.   - SW to  follow up on SNF referral    Vascular dementia with behavior disturbance  Assessment & Plan  - has h/o dementia (alziemer's and/or vascular). H/o agitation in the past.   - Seems to be at her baseline now (see above)  - Holding home donepezil and memantine, unsure of the benefit given her severe dementia.     Leucocytosis  Assessment & Plan  - Resolved  - She had mild leucocytosis with left shift on admission. UA was abnormal but not diagnostic of UTI. BC, UC and CSF cultures were negative. Treated for possible aspiration pneumonia with 5 days unasyn.       Hypertension  Assessment & Plan  Has elevated BP. She was on permissive hypertension initially till stroke was ruled out. Started on PRN antihypertensives and 5mg amlodipine later. There was a good response in first 2 days when she received it but apparently it has been challenging for nurses to administer it and she refuses multiple times.  - Will continue with PO amlodipine for now and make an effort to administer PO meds  - Will keep PRN IV hydralazine if BP is >180/100    Hypokalemia  Assessment & Plan  - Resolved  - Now K is more stable pt has some PO intake  - No need for daily monitoring    Hyperlipidemia  Assessment & Plan  - Initially simvastatin was changed to lipitor   - Pt is not cooperative enough with PO medications. Benefits of statin therapy in her situation will be limited due to advanced dementia.  - Will hold off lipid therapy    Vitamin D deficiency  Assessment & Plan  - Vit D 26  - held Vit D as patient is refusing oral meds. Will resume during discharge    CKD (chronic kidney disease) stage 3, GFR 30-59 ml/min  Assessment & Plan  - Stable      Overactive bladder  Assessment & Plan  - hold tolterodine for now. Will resume on discharge      Consultants:     none    Procedures:        3/24/2017 Lumbar puncture    Imaging/ Testing:      CAROTID DUPLEX   Final Result      MR-BRAIN-WITH & W/O   Final Result      1.  Marked ventriculomegaly  "disproportionate to the sulcal markings. However, there is enlargement of the sylvian fissures which can be a feature of normal pressure hydrocephalus. There is a paucity of sulcal markings over the vertex suggesting the    so-called \"high convexity tightness sign\". These findings have been described associated with the syndrome of normal pressure hydrocephalus.   2.  Advanced supratentorial white matter disease most consistent with microvascular ischemic change.   3.  Focal and streaky areas of gradient echo hypointensity consistent with hemosiderin deposition in the anterior subcortical frontal lobes and right parietal lobe. These are most consistent with old microhemorrhage related to amyloid angiopathy. No    significant change from 12/26/2014.   4.  No evidence of acute cerebral infarction, acute hemorrhage, or mass lesion.      ECHOCARDIOGRAM COMP W/O CONT   Final Result      CT-HEAD W/O   Final Result         1.  Bilateral ventricular dilatation, appears increased since prior study. Could represent progressive atrophic changes, however appearance raises concern for component of hydrocephalus.   2.  Otherwise no acute intracranial abnormality identified   3.  Changes of small vessel ischemia.   4.  Atherosclerosis      These findings were discussed with the patient's clinician, Candy Castaneda, on 3/24/2017 3:11 AM.      CT-RENAL COLIC EVALUATION(A/P W/O)   Final Result         1.  No acute abnormality.   2.  Groundglass pulmonary nodule, recommend follow-up CT chest in 3 months for evaluation of stability.   3.  Small bilateral fat-containing inguinal hernias.   4.  Atherosclerosis.   5.  Diverticulosis      These findings were discussed with the patient's clinician, Stanley Hunter, on 3/24/2017 1:22 AM.      DX-CHEST-PORTABLE (1 VIEW)   Final Result         1.  Left basilar atelectasis or scarring, no focal infiltrate. Exam similar to prior          Discharge Medications:         Medication " Reconciliation: Completed     Medication List      START taking these medications       Instructions    amlodipine 5 MG Tabs   Last time this was given:  5 mg on 4/3/2017  8:31 AM   Commonly known as:  NORVASC    Take 1 Tab by mouth every day.   Dose:  5 mg       guaifenesin -10 MG/5ML Syrp syrup   Commonly known as:  ROBITUSSIN DM    Take 10 mL by mouth every 6 hours as needed for Cough.   Dose:  10 mL       thiamine 100 MG tablet   Last time this was given:  100 mg on 4/2/2017  9:04 AM   Commonly known as:  THIAMINE    Take 1 Tab by mouth every day.   Dose:  100 mg         CONTINUE taking these medications       Instructions    aspirin 81 MG tablet    Take 81 mg by mouth every day.   Dose:  81 mg       coenzyme Q-10 30 MG capsule    Take 60 mg by mouth every day.   Dose:  60 mg       quetiapine 25 MG Tabs   Commonly known as:  SEROQUEL    Take 2 Tabs by mouth every evening.   Dose:  50 mg       simvastatin 40 MG Tabs   Commonly known as:  ZOCOR    Take 1 Tab by mouth every evening.   Dose:  40 mg       tolterodine ER 2 MG Cp24   Commonly known as:  DETROL-LA    Take 2 mg by mouth every day.   Dose:  2 mg       vitamin B-12 1000 MCG Tabs    Take 1,000 mcg by mouth every day.   Dose:  1000 mcg       vitamin D 1000 UNIT Tabs   Commonly known as:  cholecalciferol    Take 1,000 Units by mouth every day.   Dose:  1000 Units         STOP taking these medications          ARICEPT 10 MG tablet   Generic drug:  donepezil       memantine 5 MG Tabs   Commonly known as:  NAMENDA       tramadol 50 MG Tabs   Commonly known as:  ULTRAM           Disposition:   [facility]    Diet:   Full liquids    Activity:   As tolerated    Instructions:        The patient was instructed to return to the ER in the event of worsening symptoms. I have counseled the patient on the importance of compliance and the patient has agreed to proceed with all medical recommendations and follow up plan indicated above.   The patient understands that  all medications come with benefits and risks. Risks may include permanent injury or death and these risks can be minimized with close reassessment and monitoring.        Primary Care Provider:    Tamia Saenz M.D.    Discharge summary faxed to primary care provider:  Deferred  Copy of discharge summary given to the patient: Completed    Follow up appointment details :      none    Pending Studies:        none    Time spent on discharge day patient visit, preparing discharge paperwork and arranging for patient follow up.    Summary of follow up issues:   Dementia,     Discharge Time (Minutes) :    45 minutes      Condition on Discharge    ______________________________________________________________________    Interval history/exam for day of discharge:    Pt states that she had a peaceful sleep. However, she refused to talk to note writer and would not allow physical exam to be performed.     Filed Vitals:    04/02/17 1520 04/02/17 2000 04/03/17 0400 04/03/17 0800   BP: 108/52 136/98 128/72 128/87   Pulse: 85 63 72 64   Temp: 36.9 °C (98.4 °F) 36.8 °C (98.2 °F) 36.6 °C (97.8 °F) 36.4 °C (97.6 °F)   TempSrc:       Resp: 16 18 18 18   Height:       Weight:       SpO2: 95% 96% 100% 95%     Weight/BMI: Body mass index is 19.67 kg/(m^2).  Pulse Oximetry: 95 %, O2 (LPM): 0, O2 Delivery: None (Room Air)    Constitutional:   Frail looking female. Talks minimally. She does not want to talk and be examined     Most Recent Labs:    Lab Results   Component Value Date/Time    WBC 11.3* 03/31/2017 07:38 AM    RBC 4.38 03/31/2017 07:38 AM    HEMOGLOBIN 12.8 03/31/2017 07:38 AM    HEMATOCRIT 37.6 03/31/2017 07:38 AM    MCV 85.8 03/31/2017 07:38 AM    MCH 29.2 03/31/2017 07:38 AM    MCHC 34.0 03/31/2017 07:38 AM    MPV 9.4 03/31/2017 07:38 AM    NEUTROPHILS-POLYS 64.30 03/31/2017 07:38 AM    LYMPHOCYTES 25.60 03/31/2017 07:38 AM    MONOCYTES 7.20 03/31/2017 07:38 AM    EOSINOPHILS 1.90 03/31/2017 07:38 AM    BASOPHILS 0.40  03/31/2017 07:38 AM    HYPOCHROMIA 1+ 10/21/2013 10:44 AM      Lab Results   Component Value Date/Time    SODIUM 136 03/31/2017 07:38 AM    POTASSIUM 4.0 03/31/2017 07:38 AM    CHLORIDE 107 03/31/2017 07:38 AM    CO2 22 03/31/2017 07:38 AM    GLUCOSE 102* 03/31/2017 07:38 AM    BUN 12 03/31/2017 07:38 AM    CREATININE 0.78 03/31/2017 07:38 AM      Lab Results   Component Value Date/Time    ALT(SGPT) 21 03/31/2017 07:38 AM    AST(SGOT) 22 03/31/2017 07:38 AM    ALKALINE PHOSPHATASE 92 03/31/2017 07:38 AM    TOTAL BILIRUBIN 0.5 03/31/2017 07:38 AM    DIRECT BILIRUBIN <0.1 06/27/2016 09:20 PM    LIPASE 58 12/29/2015 10:51 PM    ALBUMIN 4.0 03/31/2017 07:38 AM    GLOBULIN 3.1 03/31/2017 07:38 AM     No results found for: PROTHROMBTM, INR

## 2017-04-13 LAB — EKG IMPRESSION: NORMAL

## 2017-06-28 NOTE — ADDENDUM NOTE
Encounter addended by: Grisel Lyle R.N. on: 6/28/2017  4:47 PM<BR>     Documentation filed: Inpatient Document Flowsheet

## 2018-05-24 ENCOUNTER — PATIENT OUTREACH (OUTPATIENT)
Dept: HEALTH INFORMATION MANAGEMENT | Facility: OTHER | Age: 78
End: 2018-05-24

## 2018-05-24 NOTE — PROGRESS NOTES
Outcome:No answer    Please transfer to Patient Outreach Team at [phone] when patient returns call.    WebIZ Checked & Epic Updated:  yes    [facility] Verified: yes    Attempt # 1

## 2018-06-25 NOTE — PROGRESS NOTES
Attempt #:Final  Verify PCP: yes    Annual Wellness Visit Scheduling  1. Scheduling Status:Not scheduled/ Jhonny pt's care giver stated that Audrey has dementia and that now she is with [facility].         Care Gap Scheduling (Attempt to Schedule EACH Overdue Care Gap!) // Not able to address care gaps  Health Maintenance Due   Topic Date Due   • IMM DTaP/Tdap/Td Vaccine (1 - Tdap) 01/07/1959   • IMM PNEUMOCOCCAL 65+ (ADULT) LOW/MEDIUM RISK SERIES (2 of 2 - PCV13) 01/01/2015   • BONE DENSITY  02/21/2018       MyChart Activation: already active

## 2018-10-04 ENCOUNTER — PATIENT OUTREACH (OUTPATIENT)
Dept: HEALTH INFORMATION MANAGEMENT | Facility: OTHER | Age: 78
End: 2018-10-04

## 2018-10-04 NOTE — PROGRESS NOTES
Outcome: non [facility] PCP-pt [facility] a [facility]CP [facility]    Please transfer to Patient Outreach Team at [phone] when patient returns call.    WebIZ Checked & Epic Updated:  yes    [facility] Verified: yes    Attempt # 1

## 2020-01-30 NOTE — CARE PLAN
"md made aware   " Problem: Skin Integrity  Goal: Risk for impaired skin integrity will decrease  Intervention: Assess and monitor skin integrity, appearance and/or temperature  Assist patient with turning when sleeping and change pads as needed due to incontinence. Skin intact.

## 2021-01-01 ENCOUNTER — PATIENT OUTREACH (OUTPATIENT)
Dept: HEALTH INFORMATION MANAGEMENT | Facility: OTHER | Age: 81
End: 2021-01-01

## 2021-01-01 ENCOUNTER — PATIENT MESSAGE (OUTPATIENT)
Dept: HEALTH INFORMATION MANAGEMENT | Facility: OTHER | Age: 81
End: 2021-01-01

## 2021-01-01 DIAGNOSIS — Z23 NEED FOR VACCINATION: ICD-10-CM

## 2021-05-07 NOTE — PROGRESS NOTES
Outcome: Declined    Please transfer to Patient Outreach Team at [phone] when patient returns call.    WebIZ Checked & Epic Updated:  no    [facility] Verified: no    Attempt # 1